# Patient Record
Sex: FEMALE | Race: BLACK OR AFRICAN AMERICAN | NOT HISPANIC OR LATINO | URBAN - METROPOLITAN AREA
[De-identification: names, ages, dates, MRNs, and addresses within clinical notes are randomized per-mention and may not be internally consistent; named-entity substitution may affect disease eponyms.]

---

## 2018-07-18 ENCOUNTER — OUTPATIENT (OUTPATIENT)
Dept: OUTPATIENT SERVICES | Facility: HOSPITAL | Age: 23
LOS: 1 days | Discharge: STILL ADMITTED OR ADMINISTRATIVE | End: 2018-07-18

## 2018-07-19 DIAGNOSIS — J45.909 UNSPECIFIED ASTHMA, UNCOMPLICATED: ICD-10-CM

## 2018-07-19 DIAGNOSIS — Z86.59 PERSONAL HISTORY OF OTHER MENTAL AND BEHAVIORAL DISORDERS: ICD-10-CM

## 2021-12-15 ENCOUNTER — TRANSCRIPTION ENCOUNTER (OUTPATIENT)
Age: 26
End: 2021-12-15

## 2024-02-25 ENCOUNTER — INPATIENT (INPATIENT)
Facility: HOSPITAL | Age: 29
LOS: 3 days | Discharge: HOME | DRG: 987 | End: 2024-02-29
Attending: STUDENT IN AN ORGANIZED HEALTH CARE EDUCATION/TRAINING PROGRAM | Admitting: STUDENT IN AN ORGANIZED HEALTH CARE EDUCATION/TRAINING PROGRAM
Payer: MEDICAID

## 2024-02-25 VITALS
HEART RATE: 107 BPM | RESPIRATION RATE: 30 BRPM | TEMPERATURE: 100 F | DIASTOLIC BLOOD PRESSURE: 70 MMHG | OXYGEN SATURATION: 99 % | SYSTOLIC BLOOD PRESSURE: 130 MMHG

## 2024-02-25 DIAGNOSIS — T14.8XXA OTHER INJURY OF UNSPECIFIED BODY REGION, INITIAL ENCOUNTER: ICD-10-CM

## 2024-02-25 LAB
ALBUMIN SERPL ELPH-MCNC: 3.9 G/DL — SIGNIFICANT CHANGE UP (ref 3.3–5)
ALP SERPL-CCNC: 75 U/L — SIGNIFICANT CHANGE UP (ref 40–120)
ALT FLD-CCNC: 29 U/L — SIGNIFICANT CHANGE UP (ref 10–45)
ANION GAP SERPL CALC-SCNC: 18 MMOL/L — HIGH (ref 5–17)
APTT BLD: 26.3 SEC — SIGNIFICANT CHANGE UP (ref 24.5–35.6)
AST SERPL-CCNC: 22 U/L — SIGNIFICANT CHANGE UP (ref 10–40)
BASE EXCESS BLDV CALC-SCNC: -7 MMOL/L — LOW (ref -2–3)
BILIRUB SERPL-MCNC: <0.1 MG/DL — LOW (ref 0.2–1.2)
BLD GP AB SCN SERPL QL: NEGATIVE — SIGNIFICANT CHANGE UP
BUN SERPL-MCNC: 10 MG/DL — SIGNIFICANT CHANGE UP (ref 7–23)
CA-I SERPL-SCNC: 1.2 MMOL/L — SIGNIFICANT CHANGE UP (ref 1.15–1.33)
CALCIUM SERPL-MCNC: 8.4 MG/DL — SIGNIFICANT CHANGE UP (ref 8.4–10.5)
CHLORIDE BLDV-SCNC: 104 MMOL/L — SIGNIFICANT CHANGE UP (ref 96–108)
CHLORIDE SERPL-SCNC: 104 MMOL/L — SIGNIFICANT CHANGE UP (ref 96–108)
CO2 BLDV-SCNC: 22 MMOL/L — SIGNIFICANT CHANGE UP (ref 22–26)
CO2 SERPL-SCNC: 17 MMOL/L — LOW (ref 22–31)
CREAT SERPL-MCNC: 0.71 MG/DL — SIGNIFICANT CHANGE UP (ref 0.5–1.3)
EGFR: 119 ML/MIN/1.73M2 — SIGNIFICANT CHANGE UP
ETHANOL SERPL-MCNC: 70 MG/DL — HIGH (ref 0–10)
GAS PNL BLDA: SIGNIFICANT CHANGE UP
GAS PNL BLDV: 135 MMOL/L — LOW (ref 136–145)
GAS PNL BLDV: SIGNIFICANT CHANGE UP
GAS PNL BLDV: SIGNIFICANT CHANGE UP
GLUCOSE BLDV-MCNC: 136 MG/DL — HIGH (ref 70–99)
GLUCOSE SERPL-MCNC: 140 MG/DL — HIGH (ref 70–99)
HCG SERPL-ACNC: <2 MIU/ML — SIGNIFICANT CHANGE UP
HCO3 BLDV-SCNC: 20 MMOL/L — LOW (ref 22–29)
HCT VFR BLD CALC: 37.8 % — SIGNIFICANT CHANGE UP (ref 34.5–45)
HCT VFR BLD CALC: 45.2 % — HIGH (ref 34.5–45)
HCT VFR BLD CALC: 45.5 % — HIGH (ref 34.5–45)
HCT VFR BLDA CALC: 38 % — SIGNIFICANT CHANGE UP (ref 34.5–46.5)
HGB BLD CALC-MCNC: 12.6 G/DL — SIGNIFICANT CHANGE UP (ref 11.7–16.1)
HGB BLD-MCNC: 12.5 G/DL — SIGNIFICANT CHANGE UP (ref 11.5–15.5)
HGB BLD-MCNC: 15 G/DL — SIGNIFICANT CHANGE UP (ref 11.5–15.5)
HGB BLD-MCNC: 15.1 G/DL — SIGNIFICANT CHANGE UP (ref 11.5–15.5)
HOROWITZ INDEX BLDV+IHG-RTO: 36 — SIGNIFICANT CHANGE UP
INR BLD: 1.21 RATIO — HIGH (ref 0.85–1.18)
LACTATE BLDV-MCNC: 5.4 MMOL/L — CRITICAL HIGH (ref 0.5–2)
MAGNESIUM SERPL-MCNC: 2.1 MG/DL — SIGNIFICANT CHANGE UP (ref 1.6–2.6)
MCHC RBC-ENTMCNC: 28.8 PG — SIGNIFICANT CHANGE UP (ref 27–34)
MCHC RBC-ENTMCNC: 28.9 PG — SIGNIFICANT CHANGE UP (ref 27–34)
MCHC RBC-ENTMCNC: 29 PG — SIGNIFICANT CHANGE UP (ref 27–34)
MCHC RBC-ENTMCNC: 33 GM/DL — SIGNIFICANT CHANGE UP (ref 32–36)
MCHC RBC-ENTMCNC: 33.1 GM/DL — SIGNIFICANT CHANGE UP (ref 32–36)
MCHC RBC-ENTMCNC: 33.4 GM/DL — SIGNIFICANT CHANGE UP (ref 32–36)
MCV RBC AUTO: 86.3 FL — SIGNIFICANT CHANGE UP (ref 80–100)
MCV RBC AUTO: 87.5 FL — SIGNIFICANT CHANGE UP (ref 80–100)
MCV RBC AUTO: 88 FL — SIGNIFICANT CHANGE UP (ref 80–100)
NRBC # BLD: 0 /100 WBCS — SIGNIFICANT CHANGE UP (ref 0–0)
PCO2 BLDV: 46 MMHG — HIGH (ref 39–42)
PH BLDV: 7.25 — LOW (ref 7.32–7.43)
PHOSPHATE SERPL-MCNC: 2.7 MG/DL — SIGNIFICANT CHANGE UP (ref 2.5–4.5)
PLATELET # BLD AUTO: 213 K/UL — SIGNIFICANT CHANGE UP (ref 150–400)
PLATELET # BLD AUTO: 226 K/UL — SIGNIFICANT CHANGE UP (ref 150–400)
PLATELET # BLD AUTO: 251 K/UL — SIGNIFICANT CHANGE UP (ref 150–400)
PO2 BLDV: 46 MMHG — HIGH (ref 25–45)
POTASSIUM BLDV-SCNC: 3.7 MMOL/L — SIGNIFICANT CHANGE UP (ref 3.5–5.1)
POTASSIUM SERPL-MCNC: 3.8 MMOL/L — SIGNIFICANT CHANGE UP (ref 3.5–5.3)
POTASSIUM SERPL-SCNC: 3.8 MMOL/L — SIGNIFICANT CHANGE UP (ref 3.5–5.3)
PROT SERPL-MCNC: 6.4 G/DL — SIGNIFICANT CHANGE UP (ref 6–8.3)
PROTHROM AB SERPL-ACNC: 12.6 SEC — SIGNIFICANT CHANGE UP (ref 9.5–13)
RBC # BLD: 4.32 M/UL — SIGNIFICANT CHANGE UP (ref 3.8–5.2)
RBC # BLD: 5.17 M/UL — SIGNIFICANT CHANGE UP (ref 3.8–5.2)
RBC # BLD: 5.24 M/UL — HIGH (ref 3.8–5.2)
RBC # FLD: 13.2 % — SIGNIFICANT CHANGE UP (ref 10.3–14.5)
RH IG SCN BLD-IMP: POSITIVE — SIGNIFICANT CHANGE UP
SAO2 % BLDV: 75.7 % — SIGNIFICANT CHANGE UP (ref 67–88)
SODIUM SERPL-SCNC: 139 MMOL/L — SIGNIFICANT CHANGE UP (ref 135–145)
WBC # BLD: 22.81 K/UL — HIGH (ref 3.8–10.5)
WBC # BLD: 23.4 K/UL — HIGH (ref 3.8–10.5)
WBC # BLD: 6.11 K/UL — SIGNIFICANT CHANGE UP (ref 3.8–10.5)
WBC # FLD AUTO: 22.81 K/UL — HIGH (ref 3.8–10.5)
WBC # FLD AUTO: 23.4 K/UL — HIGH (ref 3.8–10.5)
WBC # FLD AUTO: 6.11 K/UL — SIGNIFICANT CHANGE UP (ref 3.8–10.5)

## 2024-02-25 PROCEDURE — 70496 CT ANGIOGRAPHY HEAD: CPT | Mod: 26

## 2024-02-25 PROCEDURE — 72125 CT NECK SPINE W/O DYE: CPT | Mod: 26

## 2024-02-25 PROCEDURE — 71045 X-RAY EXAM CHEST 1 VIEW: CPT | Mod: 26

## 2024-02-25 PROCEDURE — 99222 1ST HOSP IP/OBS MODERATE 55: CPT

## 2024-02-25 PROCEDURE — 74177 CT ABD & PELVIS W/CONTRAST: CPT | Mod: 26

## 2024-02-25 PROCEDURE — 72132 CT LUMBAR SPINE W/DYE: CPT | Mod: 26

## 2024-02-25 PROCEDURE — 72129 CT CHEST SPINE W/DYE: CPT | Mod: 26

## 2024-02-25 PROCEDURE — 99291 CRITICAL CARE FIRST HOUR: CPT | Mod: GC

## 2024-02-25 PROCEDURE — 70486 CT MAXILLOFACIAL W/O DYE: CPT | Mod: 26

## 2024-02-25 PROCEDURE — 73130 X-RAY EXAM OF HAND: CPT | Mod: 26,LT

## 2024-02-25 PROCEDURE — 99221 1ST HOSP IP/OBS SF/LOW 40: CPT | Mod: GC

## 2024-02-25 PROCEDURE — 70450 CT HEAD/BRAIN W/O DYE: CPT | Mod: 26,59

## 2024-02-25 PROCEDURE — 71260 CT THORAX DX C+: CPT | Mod: 26

## 2024-02-25 PROCEDURE — 70498 CT ANGIOGRAPHY NECK: CPT | Mod: 26

## 2024-02-25 PROCEDURE — 12005 RPR S/N/A/GEN/TRK12.6-20.0CM: CPT | Mod: RT,GC

## 2024-02-25 PROCEDURE — 36620 INSERTION CATHETER ARTERY: CPT | Mod: LT

## 2024-02-25 PROCEDURE — 99291 CRITICAL CARE FIRST HOUR: CPT | Mod: 25

## 2024-02-25 RX ORDER — ACETAMINOPHEN 500 MG
1000 TABLET ORAL EVERY 6 HOURS
Refills: 0 | Status: COMPLETED | OUTPATIENT
Start: 2024-02-25 | End: 2024-02-25

## 2024-02-25 RX ORDER — FENTANYL CITRATE 50 UG/ML
25 INJECTION INTRAVENOUS ONCE
Refills: 0 | Status: DISCONTINUED | OUTPATIENT
Start: 2024-02-25 | End: 2024-02-25

## 2024-02-25 RX ORDER — POTASSIUM PHOSPHATE, MONOBASIC POTASSIUM PHOSPHATE, DIBASIC 236; 224 MG/ML; MG/ML
15 INJECTION, SOLUTION INTRAVENOUS ONCE
Refills: 0 | Status: COMPLETED | OUTPATIENT
Start: 2024-02-25 | End: 2024-02-25

## 2024-02-25 RX ORDER — OXYCODONE HYDROCHLORIDE 5 MG/1
10 TABLET ORAL EVERY 4 HOURS
Refills: 0 | Status: DISCONTINUED | OUTPATIENT
Start: 2024-02-25 | End: 2024-02-27

## 2024-02-25 RX ORDER — CHLORHEXIDINE GLUCONATE 213 G/1000ML
1 SOLUTION TOPICAL
Refills: 0 | Status: DISCONTINUED | OUTPATIENT
Start: 2024-02-25 | End: 2024-02-27

## 2024-02-25 RX ORDER — HYDROMORPHONE HYDROCHLORIDE 2 MG/ML
0.25 INJECTION INTRAMUSCULAR; INTRAVENOUS; SUBCUTANEOUS
Refills: 0 | Status: DISCONTINUED | OUTPATIENT
Start: 2024-02-25 | End: 2024-02-25

## 2024-02-25 RX ORDER — AMPICILLIN SODIUM AND SULBACTAM SODIUM 250; 125 MG/ML; MG/ML
3 INJECTION, POWDER, FOR SUSPENSION INTRAMUSCULAR; INTRAVENOUS ONCE
Refills: 0 | Status: COMPLETED | OUTPATIENT
Start: 2024-02-25 | End: 2024-02-25

## 2024-02-25 RX ORDER — LIDOCAINE HCL 20 MG/ML
30 VIAL (ML) INJECTION ONCE
Refills: 0 | Status: COMPLETED | OUTPATIENT
Start: 2024-02-25 | End: 2024-02-25

## 2024-02-25 RX ORDER — SENNA PLUS 8.6 MG/1
2 TABLET ORAL AT BEDTIME
Refills: 0 | Status: DISCONTINUED | OUTPATIENT
Start: 2024-02-25 | End: 2024-02-27

## 2024-02-25 RX ORDER — ONDANSETRON 8 MG/1
4 TABLET, FILM COATED ORAL ONCE
Refills: 0 | Status: COMPLETED | OUTPATIENT
Start: 2024-02-25 | End: 2024-02-25

## 2024-02-25 RX ORDER — LIDOCAINE 4 G/100G
1 CREAM TOPICAL EVERY 24 HOURS
Refills: 0 | Status: DISCONTINUED | OUTPATIENT
Start: 2024-02-25 | End: 2024-02-27

## 2024-02-25 RX ORDER — POLYETHYLENE GLYCOL 3350 17 G/17G
17 POWDER, FOR SOLUTION ORAL DAILY
Refills: 0 | Status: DISCONTINUED | OUTPATIENT
Start: 2024-02-25 | End: 2024-02-27

## 2024-02-25 RX ORDER — ENOXAPARIN SODIUM 100 MG/ML
40 INJECTION SUBCUTANEOUS EVERY 24 HOURS
Refills: 0 | Status: DISCONTINUED | OUTPATIENT
Start: 2024-02-25 | End: 2024-02-27

## 2024-02-25 RX ORDER — TETANUS AND DIPHTHERIA TOXOIDS ADSORBED 2; 2 [LF]/.5ML; [LF]/.5ML
0.5 INJECTION INTRAMUSCULAR ONCE
Refills: 0 | Status: COMPLETED | OUTPATIENT
Start: 2024-02-25 | End: 2024-02-26

## 2024-02-25 RX ORDER — SODIUM CHLORIDE 9 MG/ML
250 INJECTION INTRAMUSCULAR; INTRAVENOUS; SUBCUTANEOUS ONCE
Refills: 0 | Status: DISCONTINUED | OUTPATIENT
Start: 2024-02-25 | End: 2024-02-25

## 2024-02-25 RX ORDER — SODIUM CHLORIDE 9 MG/ML
500 INJECTION, SOLUTION INTRAVENOUS ONCE
Refills: 0 | Status: COMPLETED | OUTPATIENT
Start: 2024-02-25 | End: 2024-02-25

## 2024-02-25 RX ORDER — FENTANYL CITRATE 50 UG/ML
50 INJECTION INTRAVENOUS ONCE
Refills: 0 | Status: DISCONTINUED | OUTPATIENT
Start: 2024-02-25 | End: 2024-02-25

## 2024-02-25 RX ORDER — OXYCODONE HYDROCHLORIDE 5 MG/1
5 TABLET ORAL EVERY 4 HOURS
Refills: 0 | Status: DISCONTINUED | OUTPATIENT
Start: 2024-02-25 | End: 2024-02-27

## 2024-02-25 RX ORDER — HYDROMORPHONE HYDROCHLORIDE 2 MG/ML
0.5 INJECTION INTRAMUSCULAR; INTRAVENOUS; SUBCUTANEOUS ONCE
Refills: 0 | Status: DISCONTINUED | OUTPATIENT
Start: 2024-02-25 | End: 2024-02-25

## 2024-02-25 RX ORDER — CEFAZOLIN SODIUM 1 G
2000 VIAL (EA) INJECTION ONCE
Refills: 0 | Status: COMPLETED | OUTPATIENT
Start: 2024-02-25 | End: 2024-02-25

## 2024-02-25 RX ORDER — SODIUM CHLORIDE 9 MG/ML
1000 INJECTION, SOLUTION INTRAVENOUS
Refills: 0 | Status: DISCONTINUED | OUTPATIENT
Start: 2024-02-25 | End: 2024-02-25

## 2024-02-25 RX ORDER — HYDROMORPHONE HYDROCHLORIDE 2 MG/ML
0.5 INJECTION INTRAMUSCULAR; INTRAVENOUS; SUBCUTANEOUS
Refills: 0 | Status: DISCONTINUED | OUTPATIENT
Start: 2024-02-25 | End: 2024-02-27

## 2024-02-25 RX ORDER — AMPICILLIN SODIUM AND SULBACTAM SODIUM 250; 125 MG/ML; MG/ML
INJECTION, POWDER, FOR SUSPENSION INTRAMUSCULAR; INTRAVENOUS
Refills: 0 | Status: DISCONTINUED | OUTPATIENT
Start: 2024-02-25 | End: 2024-02-27

## 2024-02-25 RX ORDER — AMPICILLIN SODIUM AND SULBACTAM SODIUM 250; 125 MG/ML; MG/ML
3 INJECTION, POWDER, FOR SUSPENSION INTRAMUSCULAR; INTRAVENOUS EVERY 6 HOURS
Refills: 0 | Status: DISCONTINUED | OUTPATIENT
Start: 2024-02-25 | End: 2024-02-27

## 2024-02-25 RX ORDER — HYDROMORPHONE HYDROCHLORIDE 2 MG/ML
0.5 INJECTION INTRAMUSCULAR; INTRAVENOUS; SUBCUTANEOUS
Refills: 0 | Status: DISCONTINUED | OUTPATIENT
Start: 2024-02-25 | End: 2024-02-25

## 2024-02-25 RX ORDER — CALCIUM GLUCONATE 100 MG/ML
2 VIAL (ML) INTRAVENOUS ONCE
Refills: 0 | Status: COMPLETED | OUTPATIENT
Start: 2024-02-25 | End: 2024-02-25

## 2024-02-25 RX ADMIN — OXYCODONE HYDROCHLORIDE 5 MILLIGRAM(S): 5 TABLET ORAL at 21:08

## 2024-02-25 RX ADMIN — HYDROMORPHONE HYDROCHLORIDE 0.5 MILLIGRAM(S): 2 INJECTION INTRAMUSCULAR; INTRAVENOUS; SUBCUTANEOUS at 22:26

## 2024-02-25 RX ADMIN — CHLORHEXIDINE GLUCONATE 1 APPLICATION(S): 213 SOLUTION TOPICAL at 05:18

## 2024-02-25 RX ADMIN — AMPICILLIN SODIUM AND SULBACTAM SODIUM 200 GRAM(S): 250; 125 INJECTION, POWDER, FOR SUSPENSION INTRAMUSCULAR; INTRAVENOUS at 13:06

## 2024-02-25 RX ADMIN — Medication 400 MILLIGRAM(S): at 11:07

## 2024-02-25 RX ADMIN — Medication 1000 MILLIGRAM(S): at 23:17

## 2024-02-25 RX ADMIN — HYDROMORPHONE HYDROCHLORIDE 0.5 MILLIGRAM(S): 2 INJECTION INTRAMUSCULAR; INTRAVENOUS; SUBCUTANEOUS at 19:45

## 2024-02-25 RX ADMIN — HYDROMORPHONE HYDROCHLORIDE 0.5 MILLIGRAM(S): 2 INJECTION INTRAMUSCULAR; INTRAVENOUS; SUBCUTANEOUS at 08:15

## 2024-02-25 RX ADMIN — HYDROMORPHONE HYDROCHLORIDE 0.5 MILLIGRAM(S): 2 INJECTION INTRAMUSCULAR; INTRAVENOUS; SUBCUTANEOUS at 02:48

## 2024-02-25 RX ADMIN — Medication 30 MILLILITER(S): at 03:00

## 2024-02-25 RX ADMIN — HYDROMORPHONE HYDROCHLORIDE 0.5 MILLIGRAM(S): 2 INJECTION INTRAMUSCULAR; INTRAVENOUS; SUBCUTANEOUS at 15:00

## 2024-02-25 RX ADMIN — Medication 1000 MILLIGRAM(S): at 18:00

## 2024-02-25 RX ADMIN — Medication 100 MILLIGRAM(S): at 04:23

## 2024-02-25 RX ADMIN — AMPICILLIN SODIUM AND SULBACTAM SODIUM 200 GRAM(S): 250; 125 INJECTION, POWDER, FOR SUSPENSION INTRAMUSCULAR; INTRAVENOUS at 17:45

## 2024-02-25 RX ADMIN — SODIUM CHLORIDE 100 MILLILITER(S): 9 INJECTION, SOLUTION INTRAVENOUS at 04:23

## 2024-02-25 RX ADMIN — SODIUM CHLORIDE 2000 MILLILITER(S): 9 INJECTION, SOLUTION INTRAVENOUS at 05:09

## 2024-02-25 RX ADMIN — HYDROMORPHONE HYDROCHLORIDE 0.5 MILLIGRAM(S): 2 INJECTION INTRAMUSCULAR; INTRAVENOUS; SUBCUTANEOUS at 07:47

## 2024-02-25 RX ADMIN — HYDROMORPHONE HYDROCHLORIDE 0.5 MILLIGRAM(S): 2 INJECTION INTRAMUSCULAR; INTRAVENOUS; SUBCUTANEOUS at 02:33

## 2024-02-25 RX ADMIN — Medication 1000 MILLIGRAM(S): at 11:33

## 2024-02-25 RX ADMIN — Medication 200 GRAM(S): at 05:01

## 2024-02-25 RX ADMIN — OXYCODONE HYDROCHLORIDE 10 MILLIGRAM(S): 5 TABLET ORAL at 19:27

## 2024-02-25 RX ADMIN — AMPICILLIN SODIUM AND SULBACTAM SODIUM 200 GRAM(S): 250; 125 INJECTION, POWDER, FOR SUSPENSION INTRAMUSCULAR; INTRAVENOUS at 23:03

## 2024-02-25 RX ADMIN — Medication 400 MILLIGRAM(S): at 23:03

## 2024-02-25 RX ADMIN — FENTANYL CITRATE 50 MICROGRAM(S): 50 INJECTION INTRAVENOUS at 00:53

## 2024-02-25 RX ADMIN — OXYCODONE HYDROCHLORIDE 10 MILLIGRAM(S): 5 TABLET ORAL at 20:15

## 2024-02-25 RX ADMIN — FENTANYL CITRATE 25 MICROGRAM(S): 50 INJECTION INTRAVENOUS at 02:00

## 2024-02-25 RX ADMIN — OXYCODONE HYDROCHLORIDE 5 MILLIGRAM(S): 5 TABLET ORAL at 21:55

## 2024-02-25 RX ADMIN — FENTANYL CITRATE 25 MICROGRAM(S): 50 INJECTION INTRAVENOUS at 01:45

## 2024-02-25 RX ADMIN — LIDOCAINE 1 PATCH: 4 CREAM TOPICAL at 16:02

## 2024-02-25 RX ADMIN — HYDROMORPHONE HYDROCHLORIDE 0.5 MILLIGRAM(S): 2 INJECTION INTRAMUSCULAR; INTRAVENOUS; SUBCUTANEOUS at 19:30

## 2024-02-25 RX ADMIN — HYDROMORPHONE HYDROCHLORIDE 0.5 MILLIGRAM(S): 2 INJECTION INTRAMUSCULAR; INTRAVENOUS; SUBCUTANEOUS at 14:15

## 2024-02-25 RX ADMIN — LIDOCAINE 1 PATCH: 4 CREAM TOPICAL at 07:30

## 2024-02-25 RX ADMIN — ONDANSETRON 4 MILLIGRAM(S): 8 TABLET, FILM COATED ORAL at 17:45

## 2024-02-25 RX ADMIN — Medication 400 MILLIGRAM(S): at 05:05

## 2024-02-25 RX ADMIN — ENOXAPARIN SODIUM 40 MILLIGRAM(S): 100 INJECTION SUBCUTANEOUS at 23:04

## 2024-02-25 RX ADMIN — LIDOCAINE 1 PATCH: 4 CREAM TOPICAL at 04:54

## 2024-02-25 RX ADMIN — HYDROMORPHONE HYDROCHLORIDE 0.5 MILLIGRAM(S): 2 INJECTION INTRAMUSCULAR; INTRAVENOUS; SUBCUTANEOUS at 23:21

## 2024-02-25 RX ADMIN — POTASSIUM PHOSPHATE, MONOBASIC POTASSIUM PHOSPHATE, DIBASIC 62.5 MILLIMOLE(S): 236; 224 INJECTION, SOLUTION INTRAVENOUS at 06:21

## 2024-02-25 RX ADMIN — Medication 400 MILLIGRAM(S): at 17:29

## 2024-02-25 RX ADMIN — Medication 1000 MILLIGRAM(S): at 05:35

## 2024-02-25 RX ADMIN — AMPICILLIN SODIUM AND SULBACTAM SODIUM 200 GRAM(S): 250; 125 INJECTION, POWDER, FOR SUSPENSION INTRAMUSCULAR; INTRAVENOUS at 08:36
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
## 2024-02-25 NOTE — PHYSICAL THERAPY INITIAL EVALUATION ADULT - ADDITIONAL COMMENTS
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
## 2024-02-25 NOTE — CONSULT NOTE ADULT - ATTENDING SUPERVISION STATEMENT
Quality 431: Preventive Care And Screening: Unhealthy Alcohol Use - Screening: Patient not identified as an unhealthy alcohol user when screened for unhealthy alcohol use using a systematic screening method
Quality 110: Preventive Care And Screening: Influenza Immunization: Influenza Immunization Ordered or Recommended, but not Administered due to system reason
Resident
Resident
Additional Notes: Advised to see local pharmacy for flu vaccination.
Detail Level: Detailed
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
## 2024-02-25 NOTE — ED PROVIDER NOTE - PHYSICAL EXAMINATION
GENERAL: Awake, alert, uncomfortable appearing GCS 15  HEENT: NC/AT, moist mucous membranes, PERRL, EOMI left tenderness zygomatic arch in c spine collar   LUNGS: CTAB, no wheezes or crackles b/l breath sounds   CARDIAC: tachycardiac no m/r/g  ABDOMEN: Soft, ,non tender, non distended, no rebound, no guarding  BACK: No midline spinal tenderness, no CVA tenderness  EXT: No edema, no calf tenderness, 2+ DP pulses bilaterally, no deformities.  NEURO: A&Ox3. Moving all extremities.  SKIN: lacerations to right posterior chest, right sided lateral to breast  right thigh   PSYCH: Normal affect.

## 2024-02-25 NOTE — PHYSICAL THERAPY INITIAL EVALUATION ADULT - ACTIVE RANGE OF MOTION EXAMINATION, REHAB EVAL
L hand/wrist splint, elbow WFL, shld WFL, limited 2* pain; RUE WFL, BLE WFL/Left UE Active ROM was WFL (within functional limits)/Right UE Active ROM was WFL (within functional limits)/Left LE Active ROM was WFL (within functional limits)/Right LE Active ROM was WFL (within functional limits)

## 2024-02-25 NOTE — PROCEDURE NOTE - NSPROCDETAILS_GEN_ALL_CORE
Seldinger technique/dressing applied/secured in place/sterile dressing applied/supine position/percutaneous
location identified, draped/prepped, sterile technique used, needle inserted/introduced/positive blood return obtained via catheter/connected to a pressurized flush line/sutured in place/all materials/supplies accounted for at end of procedure
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
## 2024-02-25 NOTE — H&P ADULT - ATTENDING COMMENTS
Level 1 Trauma Activation.    Primary survey significant for moderate tachycardia.     Secondary survey with multiple small lacerations over neck, chest and abdomen.   Complex large complex laceration to thenar eminence of left hand. Neuro exam limited due to pain and patient compliance.     CT head, C-spine, Chest Abdomen Pelvis performed.   -Right hemothorax with active extravasation.  -Fractures of the right posterior third rib and right transverse process of T3.    A/P:  -Right sided pigtail catheter placed with 600cc output.  -Admit to SICU  -Multimodal pain control  -Hand surgery evaluation for complex left hand laceration.
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
## 2024-02-25 NOTE — CONSULT NOTE ADULT - SUBJECTIVE AND OBJECTIVE BOX
See dictated note.  Written informed consent obtained.  Tolerated initial exploration and repair of left hand, splinted.  Rec: IV abx, Ancef or Unasyn/Elevation.  Will eventually require further definitive repair of injuries when stable for general anesthesia as in- or outpt.  Prognosis: guarded-poor.

## 2024-02-25 NOTE — PHYSICAL THERAPY INITIAL EVALUATION ADULT - PLANNED THERAPY INTERVENTIONS, PT EVAL
stair neg: GOAL: pt will neg 8 steps 1 HR ind in 4wks./bed mobility training/gait training/transfer training

## 2024-02-26 ENCOUNTER — TRANSCRIPTION ENCOUNTER (OUTPATIENT)
Age: 29
End: 2024-02-26

## 2024-02-26 DIAGNOSIS — J94.2 HEMOTHORAX: ICD-10-CM

## 2024-02-26 LAB
ALBUMIN SERPL ELPH-MCNC: 3.2 G/DL — LOW (ref 3.3–5)
ALP SERPL-CCNC: 57 U/L — SIGNIFICANT CHANGE UP (ref 40–120)
ALT FLD-CCNC: 16 U/L — SIGNIFICANT CHANGE UP (ref 10–45)
ANION GAP SERPL CALC-SCNC: 8 MMOL/L — SIGNIFICANT CHANGE UP (ref 5–17)
APTT BLD: 30.4 SEC — SIGNIFICANT CHANGE UP (ref 24.5–35.6)
AST SERPL-CCNC: 17 U/L — SIGNIFICANT CHANGE UP (ref 10–40)
BILIRUB SERPL-MCNC: 0.3 MG/DL — SIGNIFICANT CHANGE UP (ref 0.2–1.2)
BUN SERPL-MCNC: 8 MG/DL — SIGNIFICANT CHANGE UP (ref 7–23)
CALCIUM SERPL-MCNC: 8.2 MG/DL — LOW (ref 8.4–10.5)
CHLORIDE SERPL-SCNC: 104 MMOL/L — SIGNIFICANT CHANGE UP (ref 96–108)
CO2 SERPL-SCNC: 27 MMOL/L — SIGNIFICANT CHANGE UP (ref 22–31)
CREAT SERPL-MCNC: 0.67 MG/DL — SIGNIFICANT CHANGE UP (ref 0.5–1.3)
EGFR: 123 ML/MIN/1.73M2 — SIGNIFICANT CHANGE UP
GLUCOSE SERPL-MCNC: 102 MG/DL — HIGH (ref 70–99)
HCT VFR BLD CALC: 28.7 % — LOW (ref 34.5–45)
HCT VFR BLD CALC: 29.5 % — LOW (ref 34.5–45)
HCT VFR BLD CALC: 29.7 % — LOW (ref 34.5–45)
HGB BLD-MCNC: 10.1 G/DL — LOW (ref 11.5–15.5)
HGB BLD-MCNC: 9.8 G/DL — LOW (ref 11.5–15.5)
HGB BLD-MCNC: 9.8 G/DL — LOW (ref 11.5–15.5)
INR BLD: 1.31 RATIO — HIGH (ref 0.85–1.18)
MAGNESIUM SERPL-MCNC: 2 MG/DL — SIGNIFICANT CHANGE UP (ref 1.6–2.6)
MCHC RBC-ENTMCNC: 29.1 PG — SIGNIFICANT CHANGE UP (ref 27–34)
MCHC RBC-ENTMCNC: 29.2 PG — SIGNIFICANT CHANGE UP (ref 27–34)
MCHC RBC-ENTMCNC: 29.3 PG — SIGNIFICANT CHANGE UP (ref 27–34)
MCHC RBC-ENTMCNC: 33.2 GM/DL — SIGNIFICANT CHANGE UP (ref 32–36)
MCHC RBC-ENTMCNC: 34 GM/DL — SIGNIFICANT CHANGE UP (ref 32–36)
MCHC RBC-ENTMCNC: 34.1 GM/DL — SIGNIFICANT CHANGE UP (ref 32–36)
MCV RBC AUTO: 85.8 FL — SIGNIFICANT CHANGE UP (ref 80–100)
MCV RBC AUTO: 85.9 FL — SIGNIFICANT CHANGE UP (ref 80–100)
MCV RBC AUTO: 87.5 FL — SIGNIFICANT CHANGE UP (ref 80–100)
NRBC # BLD: 0 /100 WBCS — SIGNIFICANT CHANGE UP (ref 0–0)
PHOSPHATE SERPL-MCNC: 3.4 MG/DL — SIGNIFICANT CHANGE UP (ref 2.5–4.5)
PLATELET # BLD AUTO: 127 K/UL — LOW (ref 150–400)
PLATELET # BLD AUTO: 136 K/UL — LOW (ref 150–400)
PLATELET # BLD AUTO: 138 K/UL — LOW (ref 150–400)
POTASSIUM SERPL-MCNC: 3.7 MMOL/L — SIGNIFICANT CHANGE UP (ref 3.5–5.3)
POTASSIUM SERPL-SCNC: 3.7 MMOL/L — SIGNIFICANT CHANGE UP (ref 3.5–5.3)
PROT SERPL-MCNC: 5.2 G/DL — LOW (ref 6–8.3)
PROTHROM AB SERPL-ACNC: 14.3 SEC — HIGH (ref 9.5–13)
RBC # BLD: 3.34 M/UL — LOW (ref 3.8–5.2)
RBC # BLD: 3.37 M/UL — LOW (ref 3.8–5.2)
RBC # BLD: 3.46 M/UL — LOW (ref 3.8–5.2)
RBC # FLD: 13.4 % — SIGNIFICANT CHANGE UP (ref 10.3–14.5)
RBC # FLD: 13.5 % — SIGNIFICANT CHANGE UP (ref 10.3–14.5)
RBC # FLD: 13.5 % — SIGNIFICANT CHANGE UP (ref 10.3–14.5)
SODIUM SERPL-SCNC: 139 MMOL/L — SIGNIFICANT CHANGE UP (ref 135–145)
WBC # BLD: 7.3 K/UL — SIGNIFICANT CHANGE UP (ref 3.8–10.5)
WBC # BLD: 8.95 K/UL — SIGNIFICANT CHANGE UP (ref 3.8–10.5)
WBC # BLD: 9.57 K/UL — SIGNIFICANT CHANGE UP (ref 3.8–10.5)
WBC # FLD AUTO: 7.3 K/UL — SIGNIFICANT CHANGE UP (ref 3.8–10.5)
WBC # FLD AUTO: 8.95 K/UL — SIGNIFICANT CHANGE UP (ref 3.8–10.5)
WBC # FLD AUTO: 9.57 K/UL — SIGNIFICANT CHANGE UP (ref 3.8–10.5)

## 2024-02-26 PROCEDURE — 99232 SBSQ HOSP IP/OBS MODERATE 35: CPT

## 2024-02-26 PROCEDURE — 71045 X-RAY EXAM CHEST 1 VIEW: CPT | Mod: 26

## 2024-02-26 PROCEDURE — 99233 SBSQ HOSP IP/OBS HIGH 50: CPT | Mod: 57

## 2024-02-26 PROCEDURE — 93010 ELECTROCARDIOGRAM REPORT: CPT

## 2024-02-26 RX ORDER — ACETAMINOPHEN 500 MG
1000 TABLET ORAL EVERY 6 HOURS
Refills: 0 | Status: COMPLETED | OUTPATIENT
Start: 2024-02-26 | End: 2024-02-27

## 2024-02-26 RX ORDER — PHYTONADIONE (VIT K1) 5 MG
5 TABLET ORAL DAILY
Refills: 0 | Status: DISCONTINUED | OUTPATIENT
Start: 2024-02-26 | End: 2024-02-27

## 2024-02-26 RX ORDER — KETOROLAC TROMETHAMINE 30 MG/ML
15 SYRINGE (ML) INJECTION ONCE
Refills: 0 | Status: DISCONTINUED | OUTPATIENT
Start: 2024-02-26 | End: 2024-02-26

## 2024-02-26 RX ORDER — HYDROMORPHONE HYDROCHLORIDE 2 MG/ML
0.5 INJECTION INTRAMUSCULAR; INTRAVENOUS; SUBCUTANEOUS ONCE
Refills: 0 | Status: DISCONTINUED | OUTPATIENT
Start: 2024-02-26 | End: 2024-02-26

## 2024-02-26 RX ORDER — LIDOCAINE 4 G/100G
1 CREAM TOPICAL ONCE
Refills: 0 | Status: DISCONTINUED | OUTPATIENT
Start: 2024-02-26 | End: 2024-02-26

## 2024-02-26 RX ADMIN — HYDROMORPHONE HYDROCHLORIDE 0.5 MILLIGRAM(S): 2 INJECTION INTRAMUSCULAR; INTRAVENOUS; SUBCUTANEOUS at 22:20

## 2024-02-26 RX ADMIN — AMPICILLIN SODIUM AND SULBACTAM SODIUM 200 GRAM(S): 250; 125 INJECTION, POWDER, FOR SUSPENSION INTRAMUSCULAR; INTRAVENOUS at 23:45

## 2024-02-26 RX ADMIN — LIDOCAINE 1 PATCH: 4 CREAM TOPICAL at 05:42

## 2024-02-26 RX ADMIN — Medication 15 MILLIGRAM(S): at 02:27

## 2024-02-26 RX ADMIN — OXYCODONE HYDROCHLORIDE 10 MILLIGRAM(S): 5 TABLET ORAL at 10:40

## 2024-02-26 RX ADMIN — HYDROMORPHONE HYDROCHLORIDE 0.5 MILLIGRAM(S): 2 INJECTION INTRAMUSCULAR; INTRAVENOUS; SUBCUTANEOUS at 04:18

## 2024-02-26 RX ADMIN — HYDROMORPHONE HYDROCHLORIDE 0.5 MILLIGRAM(S): 2 INJECTION INTRAMUSCULAR; INTRAVENOUS; SUBCUTANEOUS at 23:15

## 2024-02-26 RX ADMIN — Medication 1000 MILLIGRAM(S): at 23:15

## 2024-02-26 RX ADMIN — AMPICILLIN SODIUM AND SULBACTAM SODIUM 200 GRAM(S): 250; 125 INJECTION, POWDER, FOR SUSPENSION INTRAMUSCULAR; INTRAVENOUS at 06:39

## 2024-02-26 RX ADMIN — Medication 400 MILLIGRAM(S): at 12:55

## 2024-02-26 RX ADMIN — HYDROMORPHONE HYDROCHLORIDE 0.5 MILLIGRAM(S): 2 INJECTION INTRAMUSCULAR; INTRAVENOUS; SUBCUTANEOUS at 04:34

## 2024-02-26 RX ADMIN — HYDROMORPHONE HYDROCHLORIDE 0.5 MILLIGRAM(S): 2 INJECTION INTRAMUSCULAR; INTRAVENOUS; SUBCUTANEOUS at 17:50

## 2024-02-26 RX ADMIN — LIDOCAINE 1 PATCH: 4 CREAM TOPICAL at 07:58

## 2024-02-26 RX ADMIN — HYDROMORPHONE HYDROCHLORIDE 0.5 MILLIGRAM(S): 2 INJECTION INTRAMUSCULAR; INTRAVENOUS; SUBCUTANEOUS at 14:38

## 2024-02-26 RX ADMIN — OXYCODONE HYDROCHLORIDE 10 MILLIGRAM(S): 5 TABLET ORAL at 06:43

## 2024-02-26 RX ADMIN — HYDROMORPHONE HYDROCHLORIDE 0.5 MILLIGRAM(S): 2 INJECTION INTRAMUSCULAR; INTRAVENOUS; SUBCUTANEOUS at 14:53

## 2024-02-26 RX ADMIN — OXYCODONE HYDROCHLORIDE 10 MILLIGRAM(S): 5 TABLET ORAL at 06:39

## 2024-02-26 RX ADMIN — CHLORHEXIDINE GLUCONATE 1 APPLICATION(S): 213 SOLUTION TOPICAL at 05:31

## 2024-02-26 RX ADMIN — Medication 15 MILLIGRAM(S): at 02:45

## 2024-02-26 RX ADMIN — HYDROMORPHONE HYDROCHLORIDE 0.5 MILLIGRAM(S): 2 INJECTION INTRAMUSCULAR; INTRAVENOUS; SUBCUTANEOUS at 18:30

## 2024-02-26 RX ADMIN — HYDROMORPHONE HYDROCHLORIDE 0.5 MILLIGRAM(S): 2 INJECTION INTRAMUSCULAR; INTRAVENOUS; SUBCUTANEOUS at 08:13

## 2024-02-26 RX ADMIN — ENOXAPARIN SODIUM 40 MILLIGRAM(S): 100 INJECTION SUBCUTANEOUS at 22:01

## 2024-02-26 RX ADMIN — Medication 25 MILLIGRAM(S): at 21:38

## 2024-02-26 RX ADMIN — TETANUS AND DIPHTHERIA TOXOIDS ADSORBED 0.5 MILLILITER(S): 2; 2 INJECTION INTRAMUSCULAR at 12:56

## 2024-02-26 RX ADMIN — OXYCODONE HYDROCHLORIDE 10 MILLIGRAM(S): 5 TABLET ORAL at 23:47

## 2024-02-26 RX ADMIN — OXYCODONE HYDROCHLORIDE 10 MILLIGRAM(S): 5 TABLET ORAL at 11:10

## 2024-02-26 RX ADMIN — Medication 1000 MILLIGRAM(S): at 13:30

## 2024-02-26 RX ADMIN — HYDROMORPHONE HYDROCHLORIDE 0.5 MILLIGRAM(S): 2 INJECTION INTRAMUSCULAR; INTRAVENOUS; SUBCUTANEOUS at 18:05

## 2024-02-26 RX ADMIN — Medication 5 MILLIGRAM(S): at 12:53

## 2024-02-26 RX ADMIN — Medication 400 MILLIGRAM(S): at 17:41

## 2024-02-26 RX ADMIN — Medication 400 MILLIGRAM(S): at 23:02

## 2024-02-26 RX ADMIN — HYDROMORPHONE HYDROCHLORIDE 0.5 MILLIGRAM(S): 2 INJECTION INTRAMUSCULAR; INTRAVENOUS; SUBCUTANEOUS at 18:15

## 2024-02-26 RX ADMIN — AMPICILLIN SODIUM AND SULBACTAM SODIUM 200 GRAM(S): 250; 125 INJECTION, POWDER, FOR SUSPENSION INTRAMUSCULAR; INTRAVENOUS at 17:41

## 2024-02-26 RX ADMIN — HYDROMORPHONE HYDROCHLORIDE 0.5 MILLIGRAM(S): 2 INJECTION INTRAMUSCULAR; INTRAVENOUS; SUBCUTANEOUS at 07:58

## 2024-02-26 RX ADMIN — Medication 1000 MILLIGRAM(S): at 18:11

## 2024-02-26 RX ADMIN — OXYCODONE HYDROCHLORIDE 10 MILLIGRAM(S): 5 TABLET ORAL at 00:44

## 2024-02-26 RX ADMIN — AMPICILLIN SODIUM AND SULBACTAM SODIUM 200 GRAM(S): 250; 125 INJECTION, POWDER, FOR SUSPENSION INTRAMUSCULAR; INTRAVENOUS at 12:55

## 2024-02-26 RX ADMIN — LIDOCAINE 1 PATCH: 4 CREAM TOPICAL at 17:12

## 2024-02-26 RX ADMIN — OXYCODONE HYDROCHLORIDE 10 MILLIGRAM(S): 5 TABLET ORAL at 00:52

## 2024-02-26 NOTE — PROGRESS NOTE ADULT - SUBJECTIVE AND OBJECTIVE BOX
interval events:  - unasyn added  - advanced diet to regular diet  - d/c nicholson, passed TOV  - lovenox  - IVL  - nausea, given zofran x1    SUBJECTIVE/ROS:  [ ] A ten-point review of systems was otherwise negative except as noted.  [ ] Due to altered mental status/intubation, subjective information were not able to be obtained from the patient. History was obtained, to the extent possible, from review of the chart and collateral sources of information.      NEURO  Exam: awake, alert, oriented  Meds: HYDROmorphone  Injectable 0.5 milliGRAM(s) IV Push every 3 hours PRN breakthrough pain  oxyCODONE    IR 5 milliGRAM(s) Oral every 4 hours PRN Moderate Pain (4 - 6)  oxyCODONE    IR 10 milliGRAM(s) Oral every 4 hours PRN Severe Pain (7 - 10)  [x] Adequacy of sedation and pain control has been assessed and adjusted      RESPIRATORY  RR: 21 (02-25-24 @ 23:00) (15 - 42)  SpO2: 97% (02-25-24 @ 23:00) (93% - 100%)  Exam: unlabored, clear to auscultation bilaterally  Mechanical Ventilation:   ABG - ( 25 Feb 2024 10:10 )  pH: 7.39  /  pCO2: 41    /  pO2: 79    / HCO3: 25    / Base Excess: -0.2  /  SaO2: 97.2        CARDIOVASCULAR  HR: 59 (02-25-24 @ 23:00) (59 - 107)  BP: 149/72 (02-25-24 @ 08:00) (97/44 - 150/89)  BP(mean): 104 (02-25-24 @ 08:00) (64 - 107)  ABP: 118/62 (02-25-24 @ 23:00) (95/46 - 159/101)  ABP(mean): 88 (02-25-24 @ 23:00) (64 - 124)  VBG - ( 25 Feb 2024 01:56 )  pH: 7.25  /  pCO2: 46    /  pO2: 46    / HCO3: 20    / Base Excess: -7.0  /  SaO2: 75.7   Lactate: 5.4      Exam: regular rate and rhythm  Cardiac Rhythm: sinus  Perfusion     [x]Adequate   [ ]Inadequate  Mentation   [x]Normal       [ ]Reduced  Extremities  [x]Warm         [ ]Cool  Volume Status [ ]Hypervolemic [x]Euvolemic [ ]Hypovolemic      GI/NUTRITION  Exam: soft, nontender, nondistended  Diet: reg  Meds: polyethylene glycol 3350 17 Gram(s) Oral daily  senna 2 Tablet(s) Oral at bedtime      GENITOURINARY  I&O's Detail    02-24 @ 07:01  -  02-25 @ 07:00  --------------------------------------------------------  IN:    IV PiggyBack: 212.5 mL    IV PiggyBack: 100 mL    Lactated Ringers: 400 mL    Lactated Ringers Bolus: 500 mL    PRBCs (Packed Red Blood Cells): 600 mL  Total IN: 1812.5 mL    OUT:    Chest Tube (mL): 880 mL    Voided (mL): 250 mL  Total OUT: 1130 mL    Total NET: 682.5 mL      02-25 @ 07:01 - 02-26 @ 00:11  --------------------------------------------------------  IN:    IV PiggyBack: 100 mL    IV PiggyBack: 587.5 mL    Lactated Ringers: 1200 mL    Oral Fluid: 500 mL  Total IN: 2387.5 mL    OUT:    Chest Tube (mL): 90 mL    Voided (mL): 600 mL  Total OUT: 690 mL    Total NET: 1697.5 mL        Weight (kg): 82.9 (02-25 @ 01:40)  02-25    139  |  104  |  10  ----------------------------<  140<H>  3.8   |  17<L>  |  0.71    Ca    8.4      25 Feb 2024 02:17  Phos  2.7     02-25  Mg     2.1     02-25    TPro  6.4  /  Alb  3.9  /  TBili  <0.1<L>  /  DBili  x   /  AST  22  /  ALT  29  /  AlkPhos  75  02-25    [ ] Nicholson catheter, indication: N/A  Meds:       HEMATOLOGIC  Meds: enoxaparin Injectable 40 milliGRAM(s) SubCutaneous every 24 hours    [x] VTE Prophylaxis                        15.1   23.40 )-----------( 226      ( 25 Feb 2024 04:27 )             45.2     PT/INR - ( 25 Feb 2024 02:17 )   PT: 12.6 sec;   INR: 1.21 ratio         PTT - ( 25 Feb 2024 02:17 )  PTT:26.3 sec  Transfusion     [ ] PRBC   [ ] Platelets   [ ] FFP   [ ] Cryoprecipitate      INFECTIOUS DISEASES  WBC Count: 23.40 K/uL (02-25 @ 04:27)  WBC Count: 22.81 K/uL (02-25 @ 03:32)  WBC Count: 6.11 K/uL (02-25 @ 02:17)    RECENT CULTURES:    Meds: ampicillin/sulbactam  IVPB      ampicillin/sulbactam  IVPB 3 Gram(s) IV Intermittent every 6 hours  diphtheria/tetanus Vaccine - Adult 0.5 milliLiter(s) IntraMuscular once      ENDOCRINE  CAPILLARY BLOOD GLUCOSE      OTHER MEDICATIONS:  chlorhexidine 2% Cloths 1 Application(s) Topical <User Schedule>  lidocaine   4% Patch 1 Patch Transdermal every 24 hours      CODE STATUS: full code

## 2024-02-26 NOTE — PATIENT PROFILE ADULT - NSPROHMSYMPCOND_GEN_A_NUR
adhd, anxiety, depression  R ankle surgery, w/ residual intermittent swelling RLE/behavioral health/musculoskeletal
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
## 2024-02-26 NOTE — PATIENT PROFILE ADULT - FALL HARM RISK - HARM RISK INTERVENTIONS
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
## 2024-02-26 NOTE — PROGRESS NOTE ADULT - SUBJECTIVE AND OBJECTIVE BOX
Subjective: "Hello"  States pain in back and when she breathes      V/S  T(C): 36.8 (02-26-24 @ 11:00), Max: 37.1 (02-25-24 @ 23:00)  HR: 82 (02-26-24 @ 11:00) (59 - 94)  ABP: 123/63 (02-26-24 @ 11:00) (102/53 - 131/70)  ABP(mean): 88 (02-26-24 @ 11:00) (72 - 97)  RR: 21 (02-26-24 @ 11:00) (15 - 30)  SpO2: 98% (02-26-24 @ 11:00) (92% - 98%)      CHEST TUBE:    R pigtail waterseal>serosang        AIR LEAKS:  [ ] YES [ x] NO    I&O's Detail    25 Feb 2024 07:01  -  26 Feb 2024 07:00  --------------------------------------------------------  IN:    IV PiggyBack: 100 mL    IV PiggyBack: 587.5 mL    Lactated Ringers: 1200 mL    Oral Fluid: 500 mL  Total IN: 2387.5 mL    OUT:    Chest Tube (mL): 120 mL    Voided (mL): 900 mL  Total OUT: 1020 mL    Total NET: 1367.5 mL      26 Feb 2024 07:01  -  26 Feb 2024 14:59  --------------------------------------------------------  IN:    Oral Fluid: 200 mL  Total IN: 200 mL    OUT:    Voided (mL): 250 mL  Total OUT: 250 mL    Total NET: -50 mL          02-25-24 @ 07:01  -  02-26-24 @ 07:00  --------------------------------------------------------  IN: 2387.5 mL / OUT: 1020 mL / NET: 1367.5 mL    02-26-24 @ 07:01  -  02-26-24 @ 14:59  --------------------------------------------------------  IN: 200 mL / OUT: 250 mL / NET: -50 mL      MEDICATIONS  (STANDING):  acetaminophen   IVPB .. 1000 milliGRAM(s) IV Intermittent every 6 hours  ampicillin/sulbactam  IVPB      ampicillin/sulbactam  IVPB 3 Gram(s) IV Intermittent every 6 hours  chlorhexidine 2% Cloths 1 Application(s) Topical <User Schedule>  enoxaparin Injectable 40 milliGRAM(s) SubCutaneous every 24 hours  lidocaine   4% Patch 1 Patch Transdermal every 24 hours  phytonadione   Solution 5 milliGRAM(s) Oral daily  polyethylene glycol 3350 17 Gram(s) Oral daily  senna 2 Tablet(s) Oral at bedtime      02-26    139  |  104  |  8   ----------------------------<  102<H>  3.7   |  27  |  0.67    Ca    8.2<L>      26 Feb 2024 06:11  Phos  3.4     02-26  Mg     2.0     02-26    TPro  5.2<L>  /  Alb  3.2<L>  /  TBili  0.3  /  DBili  x   /  AST  17  /  ALT  16  /  AlkPhos  57  02-26                               10.1   9.57  )-----------( 138      ( 26 Feb 2024 06:11 )             29.7        PT/INR - ( 26 Feb 2024 06:11 )   PT: 14.3 sec;   INR: 1.31 ratio         PTT - ( 26 Feb 2024 06:11 )  PTT:30.4 sec             Physical Exam:    General: NAD, laying comfortably in bed  Neck: 3 penetrating wounds to neck base closed c/d/i  Chest: No chest wall tenderness, R posterior chest penetrating wound closed c/d/i.  Cardiac: S1, S2, RRR  Respiratory: Bilateral breath sounds, clear and equal bilaterally   R   CT in place with serosang  output. waterseal   Abdomen:  RUQ penetrating wound closed c/d/i, soft, non-distended,   Ext: R lateral thigh penetrating wound closed c/d/i, LUE w/ large laceration repaired and ace wrapped.                  PAST MEDICAL & SURGICAL HISTORY:
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
## 2024-02-26 NOTE — PATIENT PROFILE ADULT - NSTOBACCOCESSATIONEDU4_GEN_A_NUR
bones(Osteoporosis,prev fx,steroid use,metastatic bone ca) Smoking even a single puff increases the likelihood of a full relapse, withdrawal symptoms peak within 1-2 weeks, but can persist for months

## 2024-02-26 NOTE — PROGRESS NOTE ADULT - SUBJECTIVE AND OBJECTIVE BOX
SURGERY DAILY PROGRESS NOTE    SUBJECTIVE: Patient seen and evaluated on AM rounds.     Overnight Events:  No acute events overnight  -----------------------------------------------------------------------------------------------------------------------------------------------------------------------------------------------------------  OBJECTIVE:  Vital Signs Last 24 Hrs  T(C): 36.8 (26 Feb 2024 07:00), Max: 37.1 (25 Feb 2024 11:00)  T(F): 98.2 (26 Feb 2024 07:00), Max: 98.8 (25 Feb 2024 23:00)  HR: 77 (26 Feb 2024 10:00) (59 - 94)  BP: --  BP(mean): --  RR: 19 (26 Feb 2024 10:00) (15 - 41)  SpO2: 97% (26 Feb 2024 10:00) (92% - 100%)    Parameters below as of 26 Feb 2024 09:27  Patient On (Oxygen Delivery Method): room air      I&O's Detail    25 Feb 2024 07:01  -  26 Feb 2024 07:00  --------------------------------------------------------  IN:    IV PiggyBack: 100 mL    IV PiggyBack: 587.5 mL    Lactated Ringers: 1200 mL    Oral Fluid: 500 mL  Total IN: 2387.5 mL    OUT:    Chest Tube (mL): 120 mL    Voided (mL): 900 mL  Total OUT: 1020 mL    Total NET: 1367.5 mL      26 Feb 2024 07:01  -  26 Feb 2024 10:53  --------------------------------------------------------  IN:    Oral Fluid: 200 mL  Total IN: 200 mL    OUT:    Voided (mL): 250 mL  Total OUT: 250 mL    Total NET: -50 mL        Daily     Daily   MEDICATIONS  (STANDING):  acetaminophen   IVPB .. 1000 milliGRAM(s) IV Intermittent every 6 hours  ampicillin/sulbactam  IVPB      ampicillin/sulbactam  IVPB 3 Gram(s) IV Intermittent every 6 hours  chlorhexidine 2% Cloths 1 Application(s) Topical <User Schedule>  diphtheria/tetanus Vaccine - Adult 0.5 milliLiter(s) IntraMuscular once  enoxaparin Injectable 40 milliGRAM(s) SubCutaneous every 24 hours  lidocaine   4% Patch 1 Patch Transdermal every 24 hours  polyethylene glycol 3350 17 Gram(s) Oral daily  senna 2 Tablet(s) Oral at bedtime    MEDICATIONS  (PRN):  HYDROmorphone  Injectable 0.5 milliGRAM(s) IV Push every 3 hours PRN breakthrough pain  oxyCODONE    IR 5 milliGRAM(s) Oral every 4 hours PRN Moderate Pain (4 - 6)  oxyCODONE    IR 10 milliGRAM(s) Oral every 4 hours PRN Severe Pain (7 - 10)      LABS:                        10.1   9.57  )-----------( 138      ( 26 Feb 2024 06:11 )             29.7     02-26    139  |  104  |  8   ----------------------------<  102<H>  3.7   |  27  |  0.67    Ca    8.2<L>      26 Feb 2024 06:11  Phos  3.4     02-26  Mg     2.0     02-26    TPro  5.2<L>  /  Alb  3.2<L>  /  TBili  0.3  /  DBili  x   /  AST  17  /  ALT  16  /  AlkPhos  57  02-26    PT/INR - ( 26 Feb 2024 06:11 )   PT: 14.3 sec;   INR: 1.31 ratio         PTT - ( 26 Feb 2024 06:11 )  PTT:30.4 sec  Urinalysis Basic - ( 26 Feb 2024 06:11 )    Color: x / Appearance: x / SG: x / pH: x  Gluc: 102 mg/dL / Ketone: x  / Bili: x / Urobili: x   Blood: x / Protein: x / Nitrite: x   Leuk Esterase: x / RBC: x / WBC x   Sq Epi: x / Non Sq Epi: x / Bacteria: x        PHYSICAL EXAM:  General: NAD, laying comfortably in bed  HEENT: Normocephalic, atraumatic, EOMI, PEERLA.  Neck: 3 penetrating wounds to neck base closed c/d/i  Chest: No chest wall tenderness, R posterior chest penetrating wound closed c/d/i. CT in place with serous output.   Cardiac: S1, S2, RRR  Respiratory: Bilateral breath sounds, clear and equal bilaterally  Abdomen:  RUQ penetrating wound closed c/d/i, soft, non-distended, TTP b /l UQ, no rebound, no guarding, no masses palpated  Groin: Normal appearing  Ext: R lateral thigh penetrating wound closed c/d/i, LUE w/ large laceration repaired and ace wrapped. palp radial b/l UE, b/l DP palp in Lower Extrem. motor and sensory grossly intact in all 4 extremities  Back: no TTP, no palpable runoff/stepoff/deformity
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
## 2024-02-27 LAB
ALBUMIN SERPL ELPH-MCNC: 3.3 G/DL — SIGNIFICANT CHANGE UP (ref 3.3–5)
ALBUMIN SERPL ELPH-MCNC: 3.4 G/DL — SIGNIFICANT CHANGE UP (ref 3.3–5)
ALP SERPL-CCNC: 56 U/L — SIGNIFICANT CHANGE UP (ref 40–120)
ALP SERPL-CCNC: 56 U/L — SIGNIFICANT CHANGE UP (ref 40–120)
ALT FLD-CCNC: 19 U/L — SIGNIFICANT CHANGE UP (ref 10–45)
ALT FLD-CCNC: 21 U/L — SIGNIFICANT CHANGE UP (ref 10–45)
ANION GAP SERPL CALC-SCNC: 11 MMOL/L — SIGNIFICANT CHANGE UP (ref 5–17)
APTT BLD: 29 SEC — SIGNIFICANT CHANGE UP (ref 24.5–35.6)
APTT BLD: 29.5 SEC — SIGNIFICANT CHANGE UP (ref 24.5–35.6)
AST SERPL-CCNC: 23 U/L — SIGNIFICANT CHANGE UP (ref 10–40)
AST SERPL-CCNC: 25 U/L — SIGNIFICANT CHANGE UP (ref 10–40)
BILIRUB SERPL-MCNC: 0.1 MG/DL — LOW (ref 0.2–1.2)
BILIRUB SERPL-MCNC: 0.2 MG/DL — SIGNIFICANT CHANGE UP (ref 0.2–1.2)
BUN SERPL-MCNC: 4 MG/DL — LOW (ref 7–23)
BUN SERPL-MCNC: <4 MG/DL — LOW (ref 7–23)
CALCIUM SERPL-MCNC: 8.4 MG/DL — SIGNIFICANT CHANGE UP (ref 8.4–10.5)
CALCIUM SERPL-MCNC: 8.4 MG/DL — SIGNIFICANT CHANGE UP (ref 8.4–10.5)
CHLORIDE SERPL-SCNC: 104 MMOL/L — SIGNIFICANT CHANGE UP (ref 96–108)
CHLORIDE SERPL-SCNC: 104 MMOL/L — SIGNIFICANT CHANGE UP (ref 96–108)
CO2 SERPL-SCNC: 25 MMOL/L — SIGNIFICANT CHANGE UP (ref 22–31)
CO2 SERPL-SCNC: 25 MMOL/L — SIGNIFICANT CHANGE UP (ref 22–31)
CREAT SERPL-MCNC: 0.6 MG/DL — SIGNIFICANT CHANGE UP (ref 0.5–1.3)
CREAT SERPL-MCNC: 0.62 MG/DL — SIGNIFICANT CHANGE UP (ref 0.5–1.3)
EGFR: 125 ML/MIN/1.73M2 — SIGNIFICANT CHANGE UP
EGFR: 126 ML/MIN/1.73M2 — SIGNIFICANT CHANGE UP
GLUCOSE SERPL-MCNC: 106 MG/DL — HIGH (ref 70–99)
GLUCOSE SERPL-MCNC: 97 MG/DL — SIGNIFICANT CHANGE UP (ref 70–99)
HCT VFR BLD CALC: 29.1 % — LOW (ref 34.5–45)
HCT VFR BLD CALC: 29.3 % — LOW (ref 34.5–45)
HCT VFR BLD CALC: 29.9 % — LOW (ref 34.5–45)
HGB BLD-MCNC: 10.1 G/DL — LOW (ref 11.5–15.5)
HGB BLD-MCNC: 9.8 G/DL — LOW (ref 11.5–15.5)
HGB BLD-MCNC: 9.8 G/DL — LOW (ref 11.5–15.5)
INR BLD: 1.11 RATIO — SIGNIFICANT CHANGE UP (ref 0.85–1.18)
INR BLD: 1.22 RATIO — HIGH (ref 0.85–1.18)
MAGNESIUM SERPL-MCNC: 2.1 MG/DL — SIGNIFICANT CHANGE UP (ref 1.6–2.6)
MAGNESIUM SERPL-MCNC: 2.2 MG/DL — SIGNIFICANT CHANGE UP (ref 1.6–2.6)
MCHC RBC-ENTMCNC: 28.7 PG — SIGNIFICANT CHANGE UP (ref 27–34)
MCHC RBC-ENTMCNC: 29.2 PG — SIGNIFICANT CHANGE UP (ref 27–34)
MCHC RBC-ENTMCNC: 29.9 PG — SIGNIFICANT CHANGE UP (ref 27–34)
MCHC RBC-ENTMCNC: 32.8 GM/DL — SIGNIFICANT CHANGE UP (ref 32–36)
MCHC RBC-ENTMCNC: 33.7 GM/DL — SIGNIFICANT CHANGE UP (ref 32–36)
MCHC RBC-ENTMCNC: 34.5 GM/DL — SIGNIFICANT CHANGE UP (ref 32–36)
MCV RBC AUTO: 86.6 FL — SIGNIFICANT CHANGE UP (ref 80–100)
MCV RBC AUTO: 86.7 FL — SIGNIFICANT CHANGE UP (ref 80–100)
MCV RBC AUTO: 87.4 FL — SIGNIFICANT CHANGE UP (ref 80–100)
NRBC # BLD: 0 /100 WBCS — SIGNIFICANT CHANGE UP (ref 0–0)
PHOSPHATE SERPL-MCNC: 3.1 MG/DL — SIGNIFICANT CHANGE UP (ref 2.5–4.5)
PHOSPHATE SERPL-MCNC: 3.3 MG/DL — SIGNIFICANT CHANGE UP (ref 2.5–4.5)
PLATELET # BLD AUTO: 134 K/UL — LOW (ref 150–400)
PLATELET # BLD AUTO: 136 K/UL — LOW (ref 150–400)
PLATELET # BLD AUTO: 139 K/UL — LOW (ref 150–400)
POTASSIUM SERPL-MCNC: 3.6 MMOL/L — SIGNIFICANT CHANGE UP (ref 3.5–5.3)
POTASSIUM SERPL-MCNC: 4.4 MMOL/L — SIGNIFICANT CHANGE UP (ref 3.5–5.3)
POTASSIUM SERPL-SCNC: 3.6 MMOL/L — SIGNIFICANT CHANGE UP (ref 3.5–5.3)
POTASSIUM SERPL-SCNC: 4.4 MMOL/L — SIGNIFICANT CHANGE UP (ref 3.5–5.3)
PROT SERPL-MCNC: 5.7 G/DL — LOW (ref 6–8.3)
PROT SERPL-MCNC: 5.8 G/DL — LOW (ref 6–8.3)
PROTHROM AB SERPL-ACNC: 12.2 SEC — SIGNIFICANT CHANGE UP (ref 9.5–13)
PROTHROM AB SERPL-ACNC: 12.7 SEC — SIGNIFICANT CHANGE UP (ref 9.5–13)
RBC # BLD: 3.36 M/UL — LOW (ref 3.8–5.2)
RBC # BLD: 3.38 M/UL — LOW (ref 3.8–5.2)
RBC # BLD: 3.42 M/UL — LOW (ref 3.8–5.2)
RBC # FLD: 13.3 % — SIGNIFICANT CHANGE UP (ref 10.3–14.5)
RBC # FLD: 13.3 % — SIGNIFICANT CHANGE UP (ref 10.3–14.5)
RBC # FLD: 13.5 % — SIGNIFICANT CHANGE UP (ref 10.3–14.5)
SODIUM SERPL-SCNC: 140 MMOL/L — SIGNIFICANT CHANGE UP (ref 135–145)
SODIUM SERPL-SCNC: 140 MMOL/L — SIGNIFICANT CHANGE UP (ref 135–145)
WBC # BLD: 6.32 K/UL — SIGNIFICANT CHANGE UP (ref 3.8–10.5)
WBC # BLD: 8.04 K/UL — SIGNIFICANT CHANGE UP (ref 3.8–10.5)
WBC # BLD: 8.85 K/UL — SIGNIFICANT CHANGE UP (ref 3.8–10.5)
WBC # FLD AUTO: 6.32 K/UL — SIGNIFICANT CHANGE UP (ref 3.8–10.5)
WBC # FLD AUTO: 8.04 K/UL — SIGNIFICANT CHANGE UP (ref 3.8–10.5)
WBC # FLD AUTO: 8.85 K/UL — SIGNIFICANT CHANGE UP (ref 3.8–10.5)

## 2024-02-27 PROCEDURE — 71045 X-RAY EXAM CHEST 1 VIEW: CPT | Mod: 26

## 2024-02-27 PROCEDURE — 99232 SBSQ HOSP IP/OBS MODERATE 35: CPT

## 2024-02-27 RX ORDER — DEXTROSE MONOHYDRATE, SODIUM CHLORIDE, AND POTASSIUM CHLORIDE 50; .745; 4.5 G/1000ML; G/1000ML; G/1000ML
1000 INJECTION, SOLUTION INTRAVENOUS
Refills: 0 | Status: DISCONTINUED | OUTPATIENT
Start: 2024-02-27 | End: 2024-02-27

## 2024-02-27 RX ORDER — OXYCODONE HYDROCHLORIDE 5 MG/1
5 TABLET ORAL EVERY 4 HOURS
Refills: 0 | Status: DISCONTINUED | OUTPATIENT
Start: 2024-02-27 | End: 2024-02-29

## 2024-02-27 RX ORDER — CHLORHEXIDINE GLUCONATE 213 G/1000ML
1 SOLUTION TOPICAL
Refills: 0 | Status: DISCONTINUED | OUTPATIENT
Start: 2024-02-27 | End: 2024-02-29

## 2024-02-27 RX ORDER — ENOXAPARIN SODIUM 100 MG/ML
40 INJECTION SUBCUTANEOUS EVERY 24 HOURS
Refills: 0 | Status: DISCONTINUED | OUTPATIENT
Start: 2024-02-27 | End: 2024-02-29

## 2024-02-27 RX ORDER — POLYETHYLENE GLYCOL 3350 17 G/17G
17 POWDER, FOR SOLUTION ORAL DAILY
Refills: 0 | Status: DISCONTINUED | OUTPATIENT
Start: 2024-02-27 | End: 2024-02-29

## 2024-02-27 RX ORDER — AMPICILLIN SODIUM AND SULBACTAM SODIUM 250; 125 MG/ML; MG/ML
3 INJECTION, POWDER, FOR SUSPENSION INTRAMUSCULAR; INTRAVENOUS EVERY 6 HOURS
Refills: 0 | Status: DISCONTINUED | OUTPATIENT
Start: 2024-02-27 | End: 2024-02-29

## 2024-02-27 RX ORDER — POTASSIUM CHLORIDE 20 MEQ
40 PACKET (EA) ORAL ONCE
Refills: 0 | Status: COMPLETED | OUTPATIENT
Start: 2024-02-27 | End: 2024-02-27

## 2024-02-27 RX ORDER — OXYCODONE HYDROCHLORIDE 5 MG/1
10 TABLET ORAL EVERY 4 HOURS
Refills: 0 | Status: DISCONTINUED | OUTPATIENT
Start: 2024-02-27 | End: 2024-02-29

## 2024-02-27 RX ORDER — LIDOCAINE 4 G/100G
1 CREAM TOPICAL EVERY 24 HOURS
Refills: 0 | Status: DISCONTINUED | OUTPATIENT
Start: 2024-02-27 | End: 2024-02-29

## 2024-02-27 RX ORDER — SENNA PLUS 8.6 MG/1
2 TABLET ORAL AT BEDTIME
Refills: 0 | Status: DISCONTINUED | OUTPATIENT
Start: 2024-02-27 | End: 2024-02-29

## 2024-02-27 RX ORDER — HYDROMORPHONE HYDROCHLORIDE 2 MG/ML
0.5 INJECTION INTRAMUSCULAR; INTRAVENOUS; SUBCUTANEOUS
Refills: 0 | Status: DISCONTINUED | OUTPATIENT
Start: 2024-02-27 | End: 2024-02-29

## 2024-02-27 RX ORDER — ACETAMINOPHEN 500 MG
975 TABLET ORAL EVERY 6 HOURS
Refills: 0 | Status: DISCONTINUED | OUTPATIENT
Start: 2024-02-27 | End: 2024-02-28

## 2024-02-27 RX ADMIN — HYDROMORPHONE HYDROCHLORIDE 0.5 MILLIGRAM(S): 2 INJECTION INTRAMUSCULAR; INTRAVENOUS; SUBCUTANEOUS at 03:32

## 2024-02-27 RX ADMIN — OXYCODONE HYDROCHLORIDE 5 MILLIGRAM(S): 5 TABLET ORAL at 08:44

## 2024-02-27 RX ADMIN — LIDOCAINE 1 PATCH: 4 CREAM TOPICAL at 22:28

## 2024-02-27 RX ADMIN — HYDROMORPHONE HYDROCHLORIDE 0.5 MILLIGRAM(S): 2 INJECTION INTRAMUSCULAR; INTRAVENOUS; SUBCUTANEOUS at 10:28

## 2024-02-27 RX ADMIN — OXYCODONE HYDROCHLORIDE 10 MILLIGRAM(S): 5 TABLET ORAL at 00:47

## 2024-02-27 RX ADMIN — CHLORHEXIDINE GLUCONATE 1 APPLICATION(S): 213 SOLUTION TOPICAL at 05:32

## 2024-02-27 RX ADMIN — OXYCODONE HYDROCHLORIDE 10 MILLIGRAM(S): 5 TABLET ORAL at 14:00

## 2024-02-27 RX ADMIN — AMPICILLIN SODIUM AND SULBACTAM SODIUM 200 GRAM(S): 250; 125 INJECTION, POWDER, FOR SUSPENSION INTRAMUSCULAR; INTRAVENOUS at 22:28

## 2024-02-27 RX ADMIN — LIDOCAINE 1 PATCH: 4 CREAM TOPICAL at 13:33

## 2024-02-27 RX ADMIN — AMPICILLIN SODIUM AND SULBACTAM SODIUM 200 GRAM(S): 250; 125 INJECTION, POWDER, FOR SUSPENSION INTRAMUSCULAR; INTRAVENOUS at 13:59

## 2024-02-27 RX ADMIN — DEXTROSE MONOHYDRATE, SODIUM CHLORIDE, AND POTASSIUM CHLORIDE 50 MILLILITER(S): 50; .745; 4.5 INJECTION, SOLUTION INTRAVENOUS at 14:00

## 2024-02-27 RX ADMIN — HYDROMORPHONE HYDROCHLORIDE 0.5 MILLIGRAM(S): 2 INJECTION INTRAMUSCULAR; INTRAVENOUS; SUBCUTANEOUS at 23:42

## 2024-02-27 RX ADMIN — HYDROMORPHONE HYDROCHLORIDE 0.5 MILLIGRAM(S): 2 INJECTION INTRAMUSCULAR; INTRAVENOUS; SUBCUTANEOUS at 03:47

## 2024-02-27 RX ADMIN — OXYCODONE HYDROCHLORIDE 5 MILLIGRAM(S): 5 TABLET ORAL at 20:16

## 2024-02-27 RX ADMIN — OXYCODONE HYDROCHLORIDE 10 MILLIGRAM(S): 5 TABLET ORAL at 15:00

## 2024-02-27 RX ADMIN — OXYCODONE HYDROCHLORIDE 10 MILLIGRAM(S): 5 TABLET ORAL at 06:50

## 2024-02-27 RX ADMIN — DEXTROSE MONOHYDRATE, SODIUM CHLORIDE, AND POTASSIUM CHLORIDE 50 MILLILITER(S): 50; .745; 4.5 INJECTION, SOLUTION INTRAVENOUS at 08:45

## 2024-02-27 RX ADMIN — LIDOCAINE 1 PATCH: 4 CREAM TOPICAL at 07:42

## 2024-02-27 RX ADMIN — Medication 25 MILLIGRAM(S): at 05:32

## 2024-02-27 RX ADMIN — POLYETHYLENE GLYCOL 3350 17 GRAM(S): 17 POWDER, FOR SOLUTION ORAL at 13:59

## 2024-02-27 RX ADMIN — OXYCODONE HYDROCHLORIDE 5 MILLIGRAM(S): 5 TABLET ORAL at 09:45

## 2024-02-27 RX ADMIN — DEXTROSE MONOHYDRATE, SODIUM CHLORIDE, AND POTASSIUM CHLORIDE 50 MILLILITER(S): 50; .745; 4.5 INJECTION, SOLUTION INTRAVENOUS at 00:57

## 2024-02-27 RX ADMIN — Medication 25 MILLIGRAM(S): at 14:03

## 2024-02-27 RX ADMIN — Medication 25 MILLIGRAM(S): at 22:28

## 2024-02-27 RX ADMIN — ENOXAPARIN SODIUM 40 MILLIGRAM(S): 100 INJECTION SUBCUTANEOUS at 14:03

## 2024-02-27 RX ADMIN — Medication 1000 MILLIGRAM(S): at 05:36

## 2024-02-27 RX ADMIN — HYDROMORPHONE HYDROCHLORIDE 0.5 MILLIGRAM(S): 2 INJECTION INTRAMUSCULAR; INTRAVENOUS; SUBCUTANEOUS at 12:40

## 2024-02-27 RX ADMIN — OXYCODONE HYDROCHLORIDE 5 MILLIGRAM(S): 5 TABLET ORAL at 20:40

## 2024-02-27 RX ADMIN — SENNA PLUS 2 TABLET(S): 8.6 TABLET ORAL at 22:28

## 2024-02-27 RX ADMIN — AMPICILLIN SODIUM AND SULBACTAM SODIUM 200 GRAM(S): 250; 125 INJECTION, POWDER, FOR SUSPENSION INTRAMUSCULAR; INTRAVENOUS at 05:21

## 2024-02-27 RX ADMIN — LIDOCAINE 1 PATCH: 4 CREAM TOPICAL at 03:32

## 2024-02-27 RX ADMIN — OXYCODONE HYDROCHLORIDE 10 MILLIGRAM(S): 5 TABLET ORAL at 07:42

## 2024-02-27 RX ADMIN — Medication 400 MILLIGRAM(S): at 05:21

## 2024-02-27 RX ADMIN — Medication 40 MILLIEQUIVALENT(S): at 03:32

## 2024-02-27 NOTE — PROGRESS NOTE ADULT - SUBJECTIVE AND OBJECTIVE BOX
TRAUMA SURGERY DAILY PROGRESS NOTE    24 Hour/Overnight Events: No acute events overnight    SUBJECTIVE: Patient seen and evaluated on AM rounds. Pt reports unchanged left hand pain, which is adequately controlled on current regimen. Denies fevers/chills, chest pain, dyspnea, abdominal pain, nausea, vomiting, and diarrhea    ------------------------------------------------------------------------------------------------------------  OBJECTIVE:  Vital Signs Last 24 Hrs  T(C): 36 (27 Feb 2024 07:00), Max: 36.8 (26 Feb 2024 11:00)  T(F): 96.8 (27 Feb 2024 07:00), Max: 98.3 (26 Feb 2024 11:00)  HR: 56 (27 Feb 2024 07:00) (56 - 94)  BP: 137/65 (26 Feb 2024 19:45) (137/65 - 137/65)  BP(mean): 93 (26 Feb 2024 19:45) (93 - 93)  RR: 19 (27 Feb 2024 07:00) (15 - 35)  SpO2: 99% (27 Feb 2024 07:00) (94% - 100%)    Parameters below as of 27 Feb 2024 07:00  Patient On (Oxygen Delivery Method): room air      I&O's Detail    26 Feb 2024 07:01  -  27 Feb 2024 07:00  --------------------------------------------------------  IN:    dextrose 5% + sodium chloride 0.45% w/ Additives: 350 mL    IV PiggyBack: 200 mL    IV PiggyBack: 600 mL    Oral Fluid: 640 mL  Total IN: 1790 mL    OUT:    Chest Tube (mL): 140 mL    Voided (mL): 2850 mL  Total OUT: 2990 mL    Total NET: -1200 mL      27 Feb 2024 07:01  -  27 Feb 2024 08:52  --------------------------------------------------------  IN:  Total IN: 0 mL    OUT:    Voided (mL): 100 mL  Total OUT: 100 mL    Total NET: -100 mL      LABS:                        9.8    8.04  )-----------( 134      ( 27 Feb 2024 02:41 )             29.9     02-27    140  |  104  |  4<L>  ----------------------------<  106<H>  3.6   |  25  |  0.60    Ca    8.4      27 Feb 2024 02:41  Phos  3.1     02-27  Mg     2.1     02-27    TPro  5.8<L>  /  Alb  3.4  /  TBili  0.2  /  DBili  x   /  AST  23  /  ALT  21  /  AlkPhos  56  02-27    LIVER FUNCTIONS - ( 27 Feb 2024 02:41 )  Alb: 3.4 g/dL / Pro: 5.8 g/dL / ALK PHOS: 56 U/L / ALT: 21 U/L / AST: 23 U/L / GGT: x           PT/INR - ( 27 Feb 2024 02:41 )   PT: 12.7 sec;   INR: 1.22 ratio    PTT - ( 27 Feb 2024 02:41 )  PTT:29.5 sec    Urinalysis Basic - ( 27 Feb 2024 02:41 )  Color: x / Appearance: x / SG: x / pH: x  Gluc: 106 mg/dL / Ketone: x  / Bili: x / Urobili: x   Blood: x / Protein: x / Nitrite: x   Leuk Esterase: x / RBC: x / WBC x   Sq Epi: x / Non Sq Epi: x / Bacteria: x      PHYSICAL EXAM:  General: NAD, laying comfortably in bed  HEENT: Normocephalic, atraumatic, EOMI, PEERLA.  Neck: 3 penetrating wounds to neck base closed c/d/i  Chest: No chest wall tenderness, R posterior chest penetrating wound closed c/d/i. CT in place with serous output.   Cardiac: S1, S2, RRR  Respiratory: Bilateral breath sounds, clear and equal bilaterally  Abdomen:  RUQ penetrating wound closed c/d/i, soft, non-distended, TTP b /l UQ, no rebound, no guarding, no masses palpated  Groin: Normal appearing  Ext: R lateral thigh penetrating wound closed c/d/i, LUE w/ large laceration repaired and ace wrapped. palp radial b/l UE, b/l DP palp in Lower Extrem. motor and sensory grossly intact in all 4 extremities  Back: no TTP, no palpable runoff/stepoff/deformity TRAUMA SURGERY DAILY PROGRESS NOTE    24 Hour/Overnight Events:   - Pigtail on suction  - lyrica q8h  - f/u morning CXR while sitting up  - D5 1/2 NS    SUBJECTIVE: Patient seen and evaluated on AM rounds. Pt reports unchanged left hand pain, which is adequately controlled on current regimen. Denies fevers/chills, chest pain, dyspnea, abdominal pain, nausea, vomiting, and diarrhea    ------------------------------------------------------------------------------------------------------------  OBJECTIVE:  Vital Signs Last 24 Hrs  T(C): 36 (27 Feb 2024 07:00), Max: 36.8 (26 Feb 2024 11:00)  T(F): 96.8 (27 Feb 2024 07:00), Max: 98.3 (26 Feb 2024 11:00)  HR: 56 (27 Feb 2024 07:00) (56 - 94)  BP: 137/65 (26 Feb 2024 19:45) (137/65 - 137/65)  BP(mean): 93 (26 Feb 2024 19:45) (93 - 93)  RR: 19 (27 Feb 2024 07:00) (15 - 35)  SpO2: 99% (27 Feb 2024 07:00) (94% - 100%)    Parameters below as of 27 Feb 2024 07:00  Patient On (Oxygen Delivery Method): room air      I&O's Detail    26 Feb 2024 07:01  -  27 Feb 2024 07:00  --------------------------------------------------------  IN:    dextrose 5% + sodium chloride 0.45% w/ Additives: 350 mL    IV PiggyBack: 200 mL    IV PiggyBack: 600 mL    Oral Fluid: 640 mL  Total IN: 1790 mL    OUT:    Chest Tube (mL): 140 mL    Voided (mL): 2850 mL  Total OUT: 2990 mL    Total NET: -1200 mL      27 Feb 2024 07:01  -  27 Feb 2024 08:52  --------------------------------------------------------  IN:  Total IN: 0 mL    OUT:    Voided (mL): 100 mL  Total OUT: 100 mL    Total NET: -100 mL      LABS:                        9.8    8.04  )-----------( 134      ( 27 Feb 2024 02:41 )             29.9     02-27    140  |  104  |  4<L>  ----------------------------<  106<H>  3.6   |  25  |  0.60    Ca    8.4      27 Feb 2024 02:41  Phos  3.1     02-27  Mg     2.1     02-27    TPro  5.8<L>  /  Alb  3.4  /  TBili  0.2  /  DBili  x   /  AST  23  /  ALT  21  /  AlkPhos  56  02-27    LIVER FUNCTIONS - ( 27 Feb 2024 02:41 )  Alb: 3.4 g/dL / Pro: 5.8 g/dL / ALK PHOS: 56 U/L / ALT: 21 U/L / AST: 23 U/L / GGT: x           PT/INR - ( 27 Feb 2024 02:41 )   PT: 12.7 sec;   INR: 1.22 ratio    PTT - ( 27 Feb 2024 02:41 )  PTT:29.5 sec    Urinalysis Basic - ( 27 Feb 2024 02:41 )  Color: x / Appearance: x / SG: x / pH: x  Gluc: 106 mg/dL / Ketone: x  / Bili: x / Urobili: x   Blood: x / Protein: x / Nitrite: x   Leuk Esterase: x / RBC: x / WBC x   Sq Epi: x / Non Sq Epi: x / Bacteria: x      PHYSICAL EXAM:  General: NAD, laying comfortably in bed  HEENT: Normocephalic, atraumatic, EOMI, PEERLA.  Neck: 3 penetrating wounds to neck base closed c/d/i  Chest: No chest wall tenderness, R posterior chest penetrating wound closed c/d/i. CT in place with serous output.   Cardiac: S1, S2, RRR  Respiratory: Bilateral breath sounds, clear and equal bilaterally  Abdomen:  RUQ penetrating wound closed c/d/i, soft, non-distended, TTP b /l UQ, no rebound, no guarding, no masses palpated  Groin: Normal appearing  Ext: R lateral thigh penetrating wound closed c/d/i, LUE w/ large laceration repaired and ace wrapped. palp radial b/l UE, b/l DP palp in Lower Extrem. motor and sensory grossly intact in all 4 extremities  Back: no TTP, no palpable runoff/stepoff/deformity
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
## 2024-02-27 NOTE — PROGRESS NOTE ADULT - SUBJECTIVE AND OBJECTIVE BOX
24 HOUR EVENTS:  - Pigtail on suction  - lyrica q8h  - RTOR 2/27 AM for hand laceration  - f/u morning CXR while sitting up  - D5 1/2 NS     SUBJECTIVE/ROS:  [x] A ten-point review of systems was otherwise negative except as noted.  [ ] Due to altered mental status/intubation, subjective information were not able to be obtained from the patient. History was obtained, to the extent possible, from review of the chart and collateral sources of information.      NEURO  Exam: awake, alert, oriented  Meds: acetaminophen   IVPB .. 1000 milliGRAM(s) IV Intermittent every 6 hours  HYDROmorphone  Injectable 0.5 milliGRAM(s) IV Push every 3 hours PRN breakthrough pain  oxyCODONE    IR 10 milliGRAM(s) Oral every 4 hours PRN Severe Pain (7 - 10)  oxyCODONE    IR 5 milliGRAM(s) Oral every 4 hours PRN Moderate Pain (4 - 6)  pregabalin 25 milliGRAM(s) Oral every 8 hours    [x] Adequacy of sedation and pain control has been assessed and adjusted      RESPIRATORY  RR: 16 (02-27-24 @ 01:00) (16 - 35)  SpO2: 96% (02-27-24 @ 01:00) (92% - 100%)  Wt(kg): --  Exam: unlabored, clear to auscultation bilaterally  Mechanical Ventilation:   ABG - ( 25 Feb 2024 10:10 )  pH: 7.39  /  pCO2: 41    /  pO2: 79    / HCO3: 25    / Base Excess: -0.2  /  SaO2: 97.2    Lactate: x          CARDIOVASCULAR  HR: 67 (02-27-24 @ 01:00) (61 - 94)  BP: 137/65 (02-26-24 @ 19:45) (137/65 - 137/65)  BP(mean): 93 (02-26-24 @ 19:45) (93 - 93)  ABP: 125/67 (02-27-24 @ 01:00) (102/53 - 154/86)  ABP(mean): 92 (02-27-24 @ 01:00) (72 - 117)  Wt(kg): --  CVP(cm H2O): --  VBG - ( 25 Feb 2024 01:56 )  pH: 7.25  /  pCO2: 46    /  pO2: 46    / HCO3: 20    / Base Excess: -7.0  /  SaO2: 75.7   Lactate: 5.4                Exam:  Cardiac Rhythm:  Perfusion     [ x]Adequate   [ ]Inadequate  Mentation   [ x]Normal       [ ]Reduced  Extremities  [ x]Warm         [ ]Cool  Volume Status [ ]Hypervolemic [ x]Euvolemic [ ]Hypovolemic  Meds:       GI/NUTRITION  Exam: abdomen soft, nontender, nondistended  Diet: NPO midnight for RTOR 2/27 AM  Meds: polyethylene glycol 3350 17 Gram(s) Oral daily  senna 2 Tablet(s) Oral at bedtime      GENITOURINARY  I&O's Detail    02-25 @ 07:01 - 02-26 @ 07:00  --------------------------------------------------------  IN:    IV PiggyBack: 100 mL    IV PiggyBack: 587.5 mL    Lactated Ringers: 1200 mL    Oral Fluid: 500 mL  Total IN: 2387.5 mL    OUT:    Chest Tube (mL): 120 mL    Voided (mL): 900 mL  Total OUT: 1020 mL    Total NET: 1367.5 mL      02-26 @ 07:01 - 02-27 @ 01:34  --------------------------------------------------------  IN:    dextrose 5% + sodium chloride 0.45% w/ Additives: 100 mL    IV PiggyBack: 200 mL    IV PiggyBack: 400 mL    Oral Fluid: 640 mL  Total IN: 1340 mL    OUT:    Chest Tube (mL): 115 mL    Voided (mL): 1950 mL  Total OUT: 2065 mL    Total NET: -725 mL          02-26    139  |  104  |  8   ----------------------------<  102<H>  3.7   |  27  |  0.67    Ca    8.2<L>      26 Feb 2024 06:11  Phos  3.4     02-26  Mg     2.0     02-26    TPro  5.2<L>  /  Alb  3.2<L>  /  TBili  0.3  /  DBili  x   /  AST  17  /  ALT  16  /  AlkPhos  57  02-26      Meds: dextrose 5% + sodium chloride 0.45% with potassium chloride 20 mEq/L 1000 milliLiter(s) IV Continuous <Continuous>  phytonadione   Solution 5 milliGRAM(s) Oral daily        HEMATOLOGIC  Meds: enoxaparin Injectable 40 milliGRAM(s) SubCutaneous every 24 hours    [x] VTE Prophylaxis                        9.8    7.30  )-----------( 136      ( 26 Feb 2024 23:27 )             29.5     PT/INR - ( 26 Feb 2024 06:11 )   PT: 14.3 sec;   INR: 1.31 ratio         PTT - ( 26 Feb 2024 06:11 )  PTT:30.4 sec  Transfusion     [ ] PRBC   [ ] Platelets   [ ] FFP   [ ] Cryoprecipitate      INFECTIOUS DISEASES  T(C): 36.7 (02-26-24 @ 23:00), Max: 36.8 (02-26-24 @ 03:00)  Wt(kg): --  WBC Count: 7.30 K/uL (02-26 @ 23:27)  WBC Count: 8.95 K/uL (02-26 @ 15:42)  WBC Count: 9.57 K/uL (02-26 @ 06:11)    Recent Cultures:    Meds: ampicillin/sulbactam  IVPB 3 Gram(s) IV Intermittent every 6 hours  ampicillin/sulbactam  IVPB            ENDOCRINE  Capillary Blood Glucose    Meds:       ACCESS DEVICES:  [x ] Peripheral IV  [ ] Central Venous Line	[ ] R	[ ] L	[ ] IJ	[ ] Fem	[ ] SC	Placed:   [ ] Arterial Line		[ ] R	[ ] L	[ ] Fem	[ ] Rad	[ ] Ax	Placed:   [ ] PICC:					[ ] Mediport  [ ] Urinary Catheter, Date Placed:   [ ] Necessity of urinary, arterial, and venous catheters discussed    OTHER MEDICATIONS:  chlorhexidine 2% Cloths 1 Application(s) Topical <User Schedule>  lidocaine   4% Patch 1 Patch Transdermal every 24 hours      CODE STATUS:     IMAGING:

## 2024-02-27 NOTE — PROGRESS NOTE ADULT - SUBJECTIVE AND OBJECTIVE BOX
Subjective:  "Hello"  Sitting up in bed      V/S  T(C): 36.5 (02-27-24 @ 12:40), Max: 36.8 (02-27-24 @ 03:00)  HR: 93 (02-27-24 @ 12:40) (52 - 94)  BP: 137/65 (02-27-24 @ 09:49) (137/65 - 137/65)  ABP: 126/87 (02-27-24 @ 12:40) (111/51 - 154/86)  ABP(mean): 105 (02-27-24 @ 12:40) (75 - 117)  RR: 18 (02-27-24 @ 12:40) (15 - 29)  SpO2: 99% (02-27-24 @ 12:40) (95% - 100%)      CHEST TUBE:     R pigtail waterseal     serosang fluid draining  AIR LEAKS:  [ ] YES [x ] NO    I&O's Detail    26 Feb 2024 07:01  -  27 Feb 2024 07:00  --------------------------------------------------------  IN:    dextrose 5% + sodium chloride 0.45% w/ Additives: 350 mL    IV PiggyBack: 200 mL    IV PiggyBack: 600 mL    Oral Fluid: 640 mL  Total IN: 1790 mL    OUT:    Chest Tube (mL): 140 mL    Voided (mL): 2850 mL  Total OUT: 2990 mL    Total NET: -1200 mL      27 Feb 2024 07:01  -  27 Feb 2024 14:21  --------------------------------------------------------  IN:    dextrose 5% + sodium chloride 0.45% w/ Additives: 100 mL  Total IN: 100 mL    OUT:    Voided (mL): 800 mL  Total OUT: 800 mL    Total NET: -700 mL          02-26-24 @ 07:01  -  02-27-24 @ 07:00  --------------------------------------------------------  IN: 1790 mL / OUT: 2990 mL / NET: -1200 mL    02-27-24 @ 07:01  -  02-27-24 @ 14:21  --------------------------------------------------------  IN: 100 mL / OUT: 800 mL / NET: -700 mL      MEDICATIONS  (STANDING):  ampicillin/sulbactam  IVPB 3 Gram(s) IV Intermittent every 6 hours  chlorhexidine 2% Cloths 1 Application(s) Topical <User Schedule>  dextrose 5% + sodium chloride 0.45% with potassium chloride 20 mEq/L 1000 milliLiter(s) (50 mL/Hr) IV Continuous <Continuous>  enoxaparin Injectable 40 milliGRAM(s) SubCutaneous every 24 hours  lidocaine   4% Patch 1 Patch Transdermal every 24 hours  polyethylene glycol 3350 17 Gram(s) Oral daily  pregabalin 25 milliGRAM(s) Oral every 8 hours  senna 2 Tablet(s) Oral at bedtime      02-27    x   |  x   |  <4<L>  ----------------------------<  97  x    |  25  |  0.62    Ca    8.4      27 Feb 2024 13:11  Phos  3.3     02-27  Mg     2.2     02-27    TPro  5.7<L>  /  Alb  3.3  /  TBili  0.1<L>  /  DBili  x   /  AST  25  /  ALT  19  /  AlkPhos  56  02-27                               9.8    8.85  )-----------( 139      ( 27 Feb 2024 13:11 )             29.1        PT/INR - ( 27 Feb 2024 13:11 )   PT: 12.2 sec;   INR: 1.11 ratio         PTT - ( 27 Feb 2024 13:11 )  PTT:29.0 sec        Physical Exam:    General: NAD, laying comfortably in bed  Neck: 3 penetrating wounds to neck base closed c/d/i  Chest: No chest wall tenderness, R posterior chest penetrating wound closed c/d/i.  Cardiac: S1, S2, RRR  Respiratory: Bilateral breath sounds, clear and equal bilaterally   R   CT in place with serosang  output. waterseal   Abdomen:  RUQ penetrating wound closed c/d/i, soft, non-distended,   Ext: R lateral thigh penetrating wound closed c/d/i, LUE w/ large laceration repaired and ace wrapped.        PAST MEDICAL & SURGICAL HISTORY:
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
## 2024-02-27 NOTE — PROVIDER CONTACT NOTE (OTHER) - ACTION/TREATMENT ORDERED:
Pt. asessed by Jj SANTACRUZ/Luis Fung/Doe Yo/[place].  UA sent, Pt. encouraged to use I/S, drink fluids/Monitor/PP/RN
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
## 2024-02-28 ENCOUNTER — TRANSCRIPTION ENCOUNTER (OUTPATIENT)
Age: 29
End: 2024-02-28

## 2024-02-28 LAB
ANION GAP SERPL CALC-SCNC: 15 MMOL/L — SIGNIFICANT CHANGE UP (ref 5–17)
BUN SERPL-MCNC: 4 MG/DL — LOW (ref 7–23)
CALCIUM SERPL-MCNC: 9.2 MG/DL — SIGNIFICANT CHANGE UP (ref 8.4–10.5)
CHLORIDE SERPL-SCNC: 103 MMOL/L — SIGNIFICANT CHANGE UP (ref 96–108)
CO2 SERPL-SCNC: 23 MMOL/L — SIGNIFICANT CHANGE UP (ref 22–31)
CREAT SERPL-MCNC: 0.67 MG/DL — SIGNIFICANT CHANGE UP (ref 0.5–1.3)
EGFR: 123 ML/MIN/1.73M2 — SIGNIFICANT CHANGE UP
GLUCOSE SERPL-MCNC: 94 MG/DL — SIGNIFICANT CHANGE UP (ref 70–99)
HCT VFR BLD CALC: 33.4 % — LOW (ref 34.5–45)
HGB BLD-MCNC: 11.1 G/DL — LOW (ref 11.5–15.5)
MAGNESIUM SERPL-MCNC: 2.2 MG/DL — SIGNIFICANT CHANGE UP (ref 1.6–2.6)
MCHC RBC-ENTMCNC: 29.7 PG — SIGNIFICANT CHANGE UP (ref 27–34)
MCHC RBC-ENTMCNC: 33.2 GM/DL — SIGNIFICANT CHANGE UP (ref 32–36)
MCV RBC AUTO: 89.3 FL — SIGNIFICANT CHANGE UP (ref 80–100)
NRBC # BLD: 0 /100 WBCS — SIGNIFICANT CHANGE UP (ref 0–0)
PHOSPHATE SERPL-MCNC: 3.7 MG/DL — SIGNIFICANT CHANGE UP (ref 2.5–4.5)
PLATELET # BLD AUTO: 175 K/UL — SIGNIFICANT CHANGE UP (ref 150–400)
POTASSIUM SERPL-MCNC: 3.9 MMOL/L — SIGNIFICANT CHANGE UP (ref 3.5–5.3)
POTASSIUM SERPL-SCNC: 3.9 MMOL/L — SIGNIFICANT CHANGE UP (ref 3.5–5.3)
RBC # BLD: 3.74 M/UL — LOW (ref 3.8–5.2)
RBC # FLD: 13.2 % — SIGNIFICANT CHANGE UP (ref 10.3–14.5)
SODIUM SERPL-SCNC: 141 MMOL/L — SIGNIFICANT CHANGE UP (ref 135–145)
WBC # BLD: 14.41 K/UL — HIGH (ref 3.8–10.5)
WBC # FLD AUTO: 14.41 K/UL — HIGH (ref 3.8–10.5)

## 2024-02-28 PROCEDURE — 71045 X-RAY EXAM CHEST 1 VIEW: CPT | Mod: 26,77

## 2024-02-28 PROCEDURE — 99232 SBSQ HOSP IP/OBS MODERATE 35: CPT

## 2024-02-28 PROCEDURE — 71045 X-RAY EXAM CHEST 1 VIEW: CPT | Mod: 26

## 2024-02-28 RX ORDER — POTASSIUM CHLORIDE 20 MEQ
10 PACKET (EA) ORAL ONCE
Refills: 0 | Status: COMPLETED | OUTPATIENT
Start: 2024-02-28 | End: 2024-02-28

## 2024-02-28 RX ORDER — METHOCARBAMOL 500 MG/1
750 TABLET, FILM COATED ORAL ONCE
Refills: 0 | Status: COMPLETED | OUTPATIENT
Start: 2024-02-28 | End: 2024-02-28

## 2024-02-28 RX ORDER — ACETAMINOPHEN 500 MG
975 TABLET ORAL EVERY 6 HOURS
Refills: 0 | Status: DISCONTINUED | OUTPATIENT
Start: 2024-02-28 | End: 2024-02-29

## 2024-02-28 RX ORDER — GABAPENTIN 400 MG/1
300 CAPSULE ORAL ONCE
Refills: 0 | Status: COMPLETED | OUTPATIENT
Start: 2024-02-28 | End: 2024-02-28

## 2024-02-28 RX ORDER — POTASSIUM CHLORIDE 20 MEQ
10 PACKET (EA) ORAL DAILY
Refills: 0 | Status: DISCONTINUED | OUTPATIENT
Start: 2024-02-28 | End: 2024-02-28

## 2024-02-28 RX ADMIN — Medication 25 MILLIGRAM(S): at 05:39

## 2024-02-28 RX ADMIN — AMPICILLIN SODIUM AND SULBACTAM SODIUM 200 GRAM(S): 250; 125 INJECTION, POWDER, FOR SUSPENSION INTRAMUSCULAR; INTRAVENOUS at 23:31

## 2024-02-28 RX ADMIN — Medication 25 MILLIGRAM(S): at 13:24

## 2024-02-28 RX ADMIN — OXYCODONE HYDROCHLORIDE 10 MILLIGRAM(S): 5 TABLET ORAL at 02:06

## 2024-02-28 RX ADMIN — Medication 975 MILLIGRAM(S): at 23:31

## 2024-02-28 RX ADMIN — Medication 975 MILLIGRAM(S): at 11:45

## 2024-02-28 RX ADMIN — OXYCODONE HYDROCHLORIDE 10 MILLIGRAM(S): 5 TABLET ORAL at 10:45

## 2024-02-28 RX ADMIN — OXYCODONE HYDROCHLORIDE 10 MILLIGRAM(S): 5 TABLET ORAL at 11:45

## 2024-02-28 RX ADMIN — Medication 10 MILLIEQUIVALENT(S): at 10:45

## 2024-02-28 RX ADMIN — AMPICILLIN SODIUM AND SULBACTAM SODIUM 200 GRAM(S): 250; 125 INJECTION, POWDER, FOR SUSPENSION INTRAMUSCULAR; INTRAVENOUS at 10:45

## 2024-02-28 RX ADMIN — POLYETHYLENE GLYCOL 3350 17 GRAM(S): 17 POWDER, FOR SOLUTION ORAL at 11:21

## 2024-02-28 RX ADMIN — Medication 975 MILLIGRAM(S): at 17:13

## 2024-02-28 RX ADMIN — OXYCODONE HYDROCHLORIDE 10 MILLIGRAM(S): 5 TABLET ORAL at 23:53

## 2024-02-28 RX ADMIN — LIDOCAINE 1 PATCH: 4 CREAM TOPICAL at 22:36

## 2024-02-28 RX ADMIN — OXYCODONE HYDROCHLORIDE 10 MILLIGRAM(S): 5 TABLET ORAL at 22:37

## 2024-02-28 RX ADMIN — OXYCODONE HYDROCHLORIDE 10 MILLIGRAM(S): 5 TABLET ORAL at 01:06

## 2024-02-28 RX ADMIN — HYDROMORPHONE HYDROCHLORIDE 0.5 MILLIGRAM(S): 2 INJECTION INTRAMUSCULAR; INTRAVENOUS; SUBCUTANEOUS at 00:42

## 2024-02-28 RX ADMIN — AMPICILLIN SODIUM AND SULBACTAM SODIUM 200 GRAM(S): 250; 125 INJECTION, POWDER, FOR SUSPENSION INTRAMUSCULAR; INTRAVENOUS at 05:39

## 2024-02-28 RX ADMIN — LIDOCAINE 1 PATCH: 4 CREAM TOPICAL at 10:16

## 2024-02-28 RX ADMIN — AMPICILLIN SODIUM AND SULBACTAM SODIUM 200 GRAM(S): 250; 125 INJECTION, POWDER, FOR SUSPENSION INTRAMUSCULAR; INTRAVENOUS at 17:13

## 2024-02-28 RX ADMIN — GABAPENTIN 300 MILLIGRAM(S): 400 CAPSULE ORAL at 01:07

## 2024-02-28 RX ADMIN — Medication 25 MILLIGRAM(S): at 22:37

## 2024-02-28 RX ADMIN — Medication 975 MILLIGRAM(S): at 06:39

## 2024-02-28 RX ADMIN — METHOCARBAMOL 750 MILLIGRAM(S): 500 TABLET, FILM COATED ORAL at 03:43

## 2024-02-28 RX ADMIN — Medication 975 MILLIGRAM(S): at 11:21

## 2024-02-28 RX ADMIN — ENOXAPARIN SODIUM 40 MILLIGRAM(S): 100 INJECTION SUBCUTANEOUS at 13:24

## 2024-02-28 RX ADMIN — SENNA PLUS 2 TABLET(S): 8.6 TABLET ORAL at 22:37

## 2024-02-28 RX ADMIN — Medication 975 MILLIGRAM(S): at 05:39

## 2024-02-28 RX ADMIN — CHLORHEXIDINE GLUCONATE 1 APPLICATION(S): 213 SOLUTION TOPICAL at 05:46

## 2024-02-28 NOTE — OCCUPATIONAL THERAPY INITIAL EVALUATION ADULT - ADL RETRAINING, OT EVAL
Pt will be I with toileting within 2 weeks. Pt will be I with UB/LB dressing within 2 weeks.
Pt will be I with toileting within 2 weeks. Pt will be I with UB/LB dressing within 2 weeks.

## 2024-02-28 NOTE — OCCUPATIONAL THERAPY INITIAL EVALUATION ADULT - TRANSFER TRAINING, PT EVAL
Pt will perform all functional transfers with independence within 2 weeks.
Pt will perform all functional transfers with independence within 2 weeks.
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
## 2024-02-28 NOTE — OCCUPATIONAL THERAPY INITIAL EVALUATION ADULT - PLANNED THERAPY INTERVENTIONS, OT EVAL
ADL retraining/transfer training
ADL retraining/balance training/bed mobility training/strengthening/transfer training
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
## 2024-02-28 NOTE — PROGRESS NOTE ADULT - PROBLEM SELECTOR PLAN 1
DC right pigtail   Reviewed  CXR  Trauma following  Thoracic surgery will sign off  Please reconsult if needs arise
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
## 2024-02-28 NOTE — PROVIDER CONTACT NOTE (OTHER) - ACTION/TREATMENT ORDERED:
MD Pedro Infante ordered a one time PO dose of Robaxin 750 mg. No further interventions at this time.

## 2024-02-28 NOTE — DISCHARGE NOTE PROVIDER - CARE PROVIDER_API CALL
Genna Patel  Plastic Surgery  [address] [place] [address], [address]  [place] [address]  Phone: [phone]  Fax: [phone]  Follow Up Time: 1 week   Genna Patel  Plastic Surgery  [address] [place] [address], [address]  [place] [address]  Phone: [phone]  Fax: [phone]  Follow Up Time: 1 week    Anurag Garvin  Phone: (   )    -  Fax: (   )    -  Scheduled Appointment: 03/06/2024

## 2024-02-28 NOTE — DISCHARGE NOTE PROVIDER - CARE PROVIDERS DIRECT ADDRESSES
[email or web address]

## 2024-02-28 NOTE — OCCUPATIONAL THERAPY INITIAL EVALUATION ADULT - MANUAL MUSCLE TESTING RESULTS, REHAB EVAL
grossly 3+/5 BUE and BLE , except L hand/wrist NT
grossly 3+/5 BUE and BLE , except L hand/wrist NT, NWB

## 2024-02-28 NOTE — OCCUPATIONAL THERAPY INITIAL EVALUATION ADULT - GENERAL OBSERVATIONS, REHAB EVAL
Pt received supine in bed with +handcuffs to RUE, +shackles to both ankles, + at bedside, +cardiac monitor, +PIV, +chest tube.
Pt received semi-supine in bed, +IVL, LUE cast

## 2024-02-28 NOTE — DISCHARGE NOTE PROVIDER - NSDCMRMEDTOKEN_GEN_ALL_CORE_FT
acetaminophen 325 mg oral tablet: 3 tab(s) orally every 6 hours  amoxicillin-clavulanate 875 mg-125 mg oral tablet: 875 milligram(s) orally 2 times a day  gabapentin 300 mg oral capsule: 1 cap(s) orally 3 times a day MDD: 3  lidocaine 4% topical film: Apply topically to affected area once a day as needed for  moderate pain Please apply and keep on patch for 12 hours and then keep off for 12 hours.  oxyCODONE 5 mg oral tablet: 1 tab(s) orally every 6 hours as needed for  severe pain MDD: 4  polyethylene glycol 3350 oral powder for reconstitution: 17 gram(s) orally once a day  pregabalin 25 mg oral capsule: 1 cap(s) orally every 8 hours MDD: 3  senna leaf extract oral tablet: 2 tab(s) orally once a day (at bedtime)
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
## 2024-02-28 NOTE — DISCHARGE NOTE PROVIDER - NSDCCPCAREPLAN_GEN_ALL_CORE_FT
PRINCIPAL DISCHARGE DIAGNOSIS  Diagnosis: Laceration of left palm  Assessment and Plan of Treatment: Plastic surgery saw you in the hospital.   You had surgery on 2/27/24, and had a left hand and wrist laceration repair.  MEDICATIONS: You were treated with IV antibiotics in the hospital. You will be discharged with oral antibiotics (Augmentin) for 10 days.   Take your medicine with a glass of water or food as told by your doctor.  Take the medicine as told. Finish them even if you start to feel better.  Do not give your medicine to other people.  Do not use your medicine in the future for a different infection.  Ask your doctor about which side effects to watch for.  Try not to miss any doses. If you miss a dose, take it as soon as possible  ACTIVITY: Please keep your left hand in the splint clean, dry, and elevated.  FOLLOW UP: Please follow up with Dr. Patel (plastic surgery) in 10 days after discharge from the hospital      SECONDARY DISCHARGE DIAGNOSES  Diagnosis: Rib fracture  Assessment and Plan of Treatment: Rib fractures take time to heal on their own in 1-3 months. The longer healing period is often associated with a continued cough or other aggravating activities.  You don't require any necessary follow up unless you have questions or concerns. If you have any questions related to your hospital stay or desire an appointment, please call the acute care surgery office at [phone].  You may take over the counter Tylenol and Motrin for mild to moderate pain.  Please stagger these medications.  Please be sure not exceed 4000mg of acetaminophen (Tylenol) in a 24 hour period. You may take narcotic pain medication for breakthrough (severe) pain not relieved with medications. Topical Salanpas lidocaine patch 1% can be applied to chest wall for topical pain relief. You may purchase a lidocaine patch at your local pharmacy over the counter. If you utilize narcotics, please take over the counter Miralax, Senna, or Colace to prevent constipation.  Use your incentive spirometer 10 times every hour for deep breathing exercises and to keep your lungs healthy.  If you experience fever > 101, pain not controlled with oxycodone, persistent nausea/vomiting, please call your surgeon or return to emergency room immediately.  Please follow up with your primary care doctor within 1-2 weeks of discharge from the hospital.      Diagnosis: Pneumothorax  Assessment and Plan of Treatment: A right chest tube was placed on 2/25, and was removed on 2/28.  Thoracic surgery saw you in the hospital. You did not require surgery    Diagnosis: Fracture of thoracic transverse process  Assessment and Plan of Treatment: Spine surgery was consulted, and you did not require surgery. You also do not need a brace or any other activity restrictions     PRINCIPAL DISCHARGE DIAGNOSIS  Diagnosis: Laceration of left palm  Assessment and Plan of Treatment: Plastic surgery saw you in the hospital.   You had surgery on 2/27/24, and had a left hand and wrist laceration repair.  MEDICATIONS: You were treated with IV antibiotics in the hospital. You will be discharged with oral antibiotics (Augmentin) for 10 days.   Take your medicine with a glass of water or food as told by your doctor.  Take the medicine as told. Finish them even if you start to feel better.  Do not give your medicine to other people.  Do not use your medicine in the future for a different infection.  Ask your doctor about which side effects to watch for.  Try not to miss any doses. If you miss a dose, take it as soon as possible  ACTIVITY: Please keep your left hand in the splint clean, dry, and elevated.  FOLLOW UP: Please follow up with Dr. Patel (plastic surgery) next week after discharge from the hospital      SECONDARY DISCHARGE DIAGNOSES  Diagnosis: Pneumothorax  Assessment and Plan of Treatment: A right chest tube was placed on 2/25, and was removed on 2/28.  Thoracic surgery saw you in the hospital. You did not require surgery. Change dressing daily and keep clean and dry.   WOUND CARE: Remove outer dressing prior to shower.  You may shower. Do not scrub incision. Pat Dry area. Sutures need to be removed 3/6.  BATHING: You may shower and/or sponge bathe 24 hours after surgery. Do no submerge the incision underwater for the next 2 weeks.  ACTIVITY: No heavy lifting anything more than 10-15lbs or straining. Otherwise, you may return to your usual level of physical activity. If you are taking narcotic pain medication (such as Oxycodone) do NOT drive a car, operate machinery or make important decisions.  PAIN: A prescription for oxycodone has been sent to the pharmacy. You should only take these for severe pain. For mild or moderate pain, you may take 975mg of tylenol every 6 hours. Do not exceed more than 4G tylenol per day.   NOTIFY YOUR SURGEON IF: You have any bleeding that does not stop, any pus draining from your wound, any fever (over 100.4 F) or chills, persistent nausea/vomiting with inability to tolerate food or liquids, persistent diarrhea, or if your pain is not controlled on your discharge pain medications.  FOLLOW-UP:  1. Please call to make a follow-up appointment within by next week of discharge with Dr. Garvin for 3/6.  2. Please follow up with your primary care physician in one week regarding your hospitalization.      Diagnosis: Fracture of thoracic transverse process  Assessment and Plan of Treatment: Spine surgery was consulted, and you did not require surgery. You also do not need a brace or any other activity restrictions    Diagnosis: Rib fracture  Assessment and Plan of Treatment: Rib fractures take time to heal on their own in 1-3 months. The longer healing period is often associated with a continued cough or other aggravating activities.  You don't require any necessary follow up unless you have questions or concerns. If you have any questions related to your hospital stay or desire an appointment, please call the acute care surgery office at [phone].  You may take over the counter Tylenol and Motrin for mild to moderate pain.  Please stagger these medications.  Please be sure not exceed 4000mg of acetaminophen (Tylenol) in a 24 hour period. You may take narcotic pain medication for breakthrough (severe) pain not relieved with medications. Topical Salanpas lidocaine patch 1% can be applied to chest wall for topical pain relief. You may purchase a lidocaine patch at your local pharmacy over the counter. If you utilize narcotics, please take over the counter Miralax, Senna, or Colace to prevent constipation.  Use your incentive spirometer 10 times every hour for deep breathing exercises and to keep your lungs healthy.  If you experience fever > 101, pain not controlled with oxycodone, persistent nausea/vomiting, please call your surgeon or return to emergency room immediately.  Please follow up with your primary care doctor within 1-2 weeks of discharge from the hospital.
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
## 2024-02-28 NOTE — DISCHARGE NOTE PROVIDER - PROVIDER TOKENS
PROVIDER:[TOKEN:[[id number]],FOLLOWUP:[1 week]] PROVIDER:[TOKEN:[[id number]],FOLLOWUP:[1 week]],FREE:[LAST:[Bharathi],FIRST:[Anurag],PHONE:[(   )    -],FAX:[(   )    -],SCHEDULEDAPPT:[03/06/2024]]

## 2024-02-28 NOTE — PROVIDER CONTACT NOTE (OTHER) - REASON
Pt. has elevated BP/Heart Rate/Daughter concerned about Urine color
Pt complaining of burning on her back
Pt complaining of knots and burning in her back
pt reporting pain in the Left thumb

## 2024-02-28 NOTE — PROGRESS NOTE ADULT - SUBJECTIVE AND OBJECTIVE BOX
SURGERY DAILY PROGRESS NOTE    SUBJECTIVE: Patient seen and evaluated on AM rounds. Pain controlled. Tolerating diet.       Overnight Events:  No acute events overnight  -----------------------------------------------------------------------------------------------------------------------------------------------------------------------------------------------------------  OBJECTIVE:  Vital Signs Last 24 Hrs  T(C): 36.6 (28 Feb 2024 08:40), Max: 37.1 (27 Feb 2024 17:00)  T(F): 97.9 (28 Feb 2024 08:40), Max: 98.7 (27 Feb 2024 17:00)  HR: 73 (28 Feb 2024 08:40) (54 - 98)  BP: 110/57 (28 Feb 2024 08:40) (110/57 - 146/93)  BP(mean): 106 (27 Feb 2024 16:00) (106 - 106)  RR: 18 (28 Feb 2024 08:40) (16 - 23)  SpO2: 94% (28 Feb 2024 08:40) (94% - 100%)    Parameters below as of 28 Feb 2024 08:40  Patient On (Oxygen Delivery Method): room air      I&O's Detail    27 Feb 2024 07:01  -  28 Feb 2024 07:00  --------------------------------------------------------  IN:    dextrose 5% + sodium chloride 0.45% w/ Additives: 100 mL    dextrose 5% + sodium chloride 0.45% w/ Additives: 100 mL    Oral Fluid: 980 mL  Total IN: 1180 mL    OUT:    Chest Tube (mL): 25 mL    Voided (mL): 1700 mL  Total OUT: 1725 mL    Total NET: -545 mL        Daily     Daily   MEDICATIONS  (STANDING):  acetaminophen     Tablet .. 975 milliGRAM(s) Oral every 6 hours  ampicillin/sulbactam  IVPB 3 Gram(s) IV Intermittent every 6 hours  chlorhexidine 2% Cloths 1 Application(s) Topical <User Schedule>  enoxaparin Injectable 40 milliGRAM(s) SubCutaneous every 24 hours  lidocaine   4% Patch 1 Patch Transdermal every 24 hours  polyethylene glycol 3350 17 Gram(s) Oral daily  pregabalin 25 milliGRAM(s) Oral every 8 hours  senna 2 Tablet(s) Oral at bedtime    MEDICATIONS  (PRN):  HYDROmorphone  Injectable 0.5 milliGRAM(s) IV Push every 3 hours PRN breakthrough pain  oxyCODONE    IR 5 milliGRAM(s) Oral every 4 hours PRN Moderate Pain (4 - 6)  oxyCODONE    IR 10 milliGRAM(s) Oral every 4 hours PRN Severe Pain (7 - 10)      LABS:                        11.1   14.41 )-----------( 175      ( 28 Feb 2024 07:15 )             33.4     02-28    141  |  103  |  4<L>  ----------------------------<  94  3.9   |  23  |  0.67    Ca    9.2      28 Feb 2024 07:15  Phos  3.7     02-28  Mg     2.2     02-28    TPro  5.7<L>  /  Alb  3.3  /  TBili  0.1<L>  /  DBili  x   /  AST  25  /  ALT  19  /  AlkPhos  56  02-27    PT/INR - ( 27 Feb 2024 13:11 )   PT: 12.2 sec;   INR: 1.11 ratio         PTT - ( 27 Feb 2024 13:11 )  PTT:29.0 sec  Urinalysis Basic - ( 28 Feb 2024 07:15 )    Color: x / Appearance: x / SG: x / pH: x  Gluc: 94 mg/dL / Ketone: x  / Bili: x / Urobili: x   Blood: x / Protein: x / Nitrite: x   Leuk Esterase: x / RBC: x / WBC x   Sq Epi: x / Non Sq Epi: x / Bacteria: x        PHYSICAL EXAM:  General: NAD, laying comfortably in bed  HEENT: Normocephalic, atraumatic, EOMI, PEERLA.  Neck: 3 penetrating wounds to neck base closed c/d/i  Chest: No chest wall tenderness, R posterior chest penetrating wound closed c/d/i. CT in place with serous output, water seal.   Respiratory: Bilateral breath sounds, clear and equal bilaterally  Abdomen:  RUQ penetrating wound closed c/d/i, soft, non-distended, TTP b /l UQ, no rebound, no guarding, no masses palpated  Groin: Normal appearing  Ext: R lateral thigh penetrating wound closed c/d/i, LUE w/ large laceration repaired and ace wrapped. Left hand wrist in ace wrap, dressing c/d/i.

## 2024-02-28 NOTE — OCCUPATIONAL THERAPY INITIAL EVALUATION ADULT - DIAGNOSIS, OT EVAL
decreased Activities of Daily Living & functional mobility skills
Patient with deficits in ADL status and functional mobility due to pain, impaired balance, strength, ROM, coordination
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
## 2024-02-28 NOTE — PROVIDER CONTACT NOTE (OTHER) - ACTION/TREATMENT ORDERED:
MD Pedro Infante stated to administered pt's PRN oxy 10mg PO. MD Infante also ordered a one time dose of 300mg gabapentin PO. No further interventions at this time. MD Pedro Infante assessed pt at bedside and stated to administer pt's PRN oxy 10mg PO. MD Infante also ordered a stat one time dose of 300mg gabapentin PO. No further interventions at this time.

## 2024-02-28 NOTE — PROVIDER CONTACT NOTE (OTHER) - SITUATION
Pt reporting pain and numbness in left thumb under ace and splint.
Pt complaining of burning on her back
Pt has elevated HR - 114, BP - 165/66
Pt complaining of knots and burning in her back

## 2024-02-28 NOTE — PROGRESS NOTE ADULT - SUBJECTIVE AND OBJECTIVE BOX
TRAUMA SURGERY DAILY PROGRESS NOTE    24 Hour/Overnight Events: Pt with back pain, and was given PRN oxycodone 10mg, Robaxin 750mg, and Gabapentin 300mg x 1    SUBJECTIVE: Patient seen and evaluated on AM rounds. Pt is resting comfortably in bed with no complaints. Pt reports appropriate surgical incisional pain, which is adequately controlled on current regimen  Pt passing gas, and having bowel movements. Tolerating diet. Ambulating well.   Denies fevers/chills, chest pain, dyspnea, abdominal pain, nausea, vomiting, and diarrhea    ------------------------------------------------------------------------------------------------------------  OBJECTIVE:  Vital Signs Last 24 Hrs  T(C): 36.6 (28 Feb 2024 08:40), Max: 37.1 (27 Feb 2024 17:00)  T(F): 97.9 (28 Feb 2024 08:40), Max: 98.7 (27 Feb 2024 17:00)  HR: 73 (28 Feb 2024 08:40) (54 - 98)  BP: 110/57 (28 Feb 2024 08:40) (110/57 - 146/93)  BP(mean): 106 (27 Feb 2024 16:00) (106 - 106)  RR: 18 (28 Feb 2024 08:40) (16 - 23)  SpO2: 94% (28 Feb 2024 08:40) (94% - 100%)    Parameters below as of 28 Feb 2024 08:40  Patient On (Oxygen Delivery Method): room air      I&O's Detail    27 Feb 2024 07:01  -  28 Feb 2024 07:00  --------------------------------------------------------  IN:    dextrose 5% + sodium chloride 0.45% w/ Additives: 100 mL    dextrose 5% + sodium chloride 0.45% w/ Additives: 100 mL    Oral Fluid: 980 mL  Total IN: 1180 mL    OUT:    Chest Tube (mL): 25 mL    Voided (mL): 1700 mL  Total OUT: 1725 mL    Total NET: -545 mL          LABS:                        11.1   14.41 )-----------( 175      ( 28 Feb 2024 07:15 )             33.4     02-28    141  |  103  |  4<L>  ----------------------------<  94  3.9   |  23  |  0.67    Ca    9.2      28 Feb 2024 07:15  Phos  3.7     02-28  Mg     2.2     02-28    TPro  5.7<L>  /  Alb  3.3  /  TBili  0.1<L>  /  DBili  x   /  AST  25  /  ALT  19  /  AlkPhos  56  02-27    LIVER FUNCTIONS - ( 27 Feb 2024 13:11 )  Alb: 3.3 g/dL / Pro: 5.7 g/dL / ALK PHOS: 56 U/L / ALT: 19 U/L / AST: 25 U/L / GGT: x           PT/INR - ( 27 Feb 2024 13:11 )   PT: 12.2 sec;   INR: 1.11 ratio         PTT - ( 27 Feb 2024 13:11 )  PTT:29.0 sec  Urinalysis Basic - ( 28 Feb 2024 07:15 )    Color: x / Appearance: x / SG: x / pH: x  Gluc: 94 mg/dL / Ketone: x  / Bili: x / Urobili: x   Blood: x / Protein: x / Nitrite: x   Leuk Esterase: x / RBC: x / WBC x   Sq Epi: x / Non Sq Epi: x / Bacteria: x        PHYSICAL EXAM:  Constitutional: Well developed, well nourished, NAD  Pulmonary: Symmetric chest rise bilaterally, no increased WOB  Gastrointestinal: Abdomen soft, nontender, nondistended, appropriate sx incisional tenderness with dressings c/d/i. No rebound or guarding. NGT with bilious output. ERIN drain w/ serosanguineous output. Ostomy pink with gas and solid stool in ostomy bag. Gravity bag with minimal bilious drainage. Midline wound vac holding suction.   Groin: Soft, nontender, no ecchymosis/hematoma, no erythema, no edema.  Extremities: (+) DP/PT pulses. Warm to touch, soft, normal strength, sensory and motor intact. No cyanosis/erythema/edema    PLAN:   - ERP Protocol  - Wyatt removed; TOV at  - Diet:  - Monitor bowel function  - Serial abd exams   - Antibiotics  - Pain medications PRN  - Monitor NGT  - Monitor ostomy output - ostomy teaching  - Monitor ERIN drain output- ERIN drain teaching  - Monitor wound vac  - Monitor Wyatt  - OOB as tolerated  - VTE ppx:  - PT recs:   - Discharge planning today    TRAUMA SURGERY DAILY PROGRESS NOTE    24 Hour/Overnight Events: Pt with back pain, and was given PRN oxycodone 10mg, Robaxin 750mg, and Gabapentin 300mg x 1    SUBJECTIVE: Patient seen and evaluated on AM rounds. Pt reports appropriate surgical incisional left hand pain, back pain, which is somewhat controlled on current regimen. Tolerating diet. Ambulating well.   Denies fevers/chills, chest pain, dyspnea, abdominal pain, nausea, vomiting, and diarrhea    ------------------------------------------------------------------------------------------------------------  OBJECTIVE:  Vital Signs Last 24 Hrs  T(C): 36.6 (28 Feb 2024 08:40), Max: 37.1 (27 Feb 2024 17:00)  T(F): 97.9 (28 Feb 2024 08:40), Max: 98.7 (27 Feb 2024 17:00)  HR: 73 (28 Feb 2024 08:40) (54 - 98)  BP: 110/57 (28 Feb 2024 08:40) (110/57 - 146/93)  BP(mean): 106 (27 Feb 2024 16:00) (106 - 106)  RR: 18 (28 Feb 2024 08:40) (16 - 23)  SpO2: 94% (28 Feb 2024 08:40) (94% - 100%)    Parameters below as of 28 Feb 2024 08:40  Patient On (Oxygen Delivery Method): room air      I&O's Detail    27 Feb 2024 07:01  -  28 Feb 2024 07:00  --------------------------------------------------------  IN:    dextrose 5% + sodium chloride 0.45% w/ Additives: 100 mL    dextrose 5% + sodium chloride 0.45% w/ Additives: 100 mL    Oral Fluid: 980 mL  Total IN: 1180 mL    OUT:    Chest Tube (mL): 25 mL    Voided (mL): 1700 mL  Total OUT: 1725 mL    Total NET: -545 mL          LABS:                        11.1   14.41 )-----------( 175      ( 28 Feb 2024 07:15 )             33.4     02-28    141  |  103  |  4<L>  ----------------------------<  94  3.9   |  23  |  0.67    Ca    9.2      28 Feb 2024 07:15  Phos  3.7     02-28  Mg     2.2     02-28    TPro  5.7<L>  /  Alb  3.3  /  TBili  0.1<L>  /  DBili  x   /  AST  25  /  ALT  19  /  AlkPhos  56  02-27    LIVER FUNCTIONS - ( 27 Feb 2024 13:11 )  Alb: 3.3 g/dL / Pro: 5.7 g/dL / ALK PHOS: 56 U/L / ALT: 19 U/L / AST: 25 U/L / GGT: x           PT/INR - ( 27 Feb 2024 13:11 )   PT: 12.2 sec;   INR: 1.11 ratio         PTT - ( 27 Feb 2024 13:11 )  PTT:29.0 sec  Urinalysis Basic - ( 28 Feb 2024 07:15 )    Color: x / Appearance: x / SG: x / pH: x  Gluc: 94 mg/dL / Ketone: x  / Bili: x / Urobili: x   Blood: x / Protein: x / Nitrite: x   Leuk Esterase: x / RBC: x / WBC x   Sq Epi: x / Non Sq Epi: x / Bacteria: x        PHYSICAL EXAM:  Constitutional: Well developed, well nourished, NAD  Pulmonary: Symmetric chest rise bilaterally, no increased WOB  Gastrointestinal: Abdomen soft, nontender, nondistended, appropriate sx incisional tenderness with dressings c/d/i. No rebound or guarding. NGT with bilious output. ERIN drain w/ serosanguineous output. Ostomy pink with gas and solid stool in ostomy bag. Gravity bag with minimal bilious drainage. Midline wound vac holding suction.   Groin: Soft, nontender, no ecchymosis/hematoma, no erythema, no edema.  Extremities: (+) DP/PT pulses. Warm to touch, soft, normal strength, sensory and motor intact. No cyanosis/erythema/edema    PLAN:   - ERP Protocol  - Wyatt removed; TOV at  - Diet:  - Monitor bowel function  - Serial abd exams   - Antibiotics  - Pain medications PRN  - Monitor NGT  - Monitor ostomy output - ostomy teaching  - Monitor ERIN drain output- ERIN drain teaching  - Monitor wound vac  - Monitor Wyatt  - OOB as tolerated  - VTE ppx:  - PT recs:   - Discharge planning today

## 2024-02-28 NOTE — OCCUPATIONAL THERAPY INITIAL EVALUATION ADULT - RANGE OF MOTION EXAMINATION, UPPER EXTREMITY
except L wrist/thumb N/T-casted. L digits II-V AROM WFL, limited by cast/bilateral UE Active ROM was WFL  (within functional limits)
except L hand/wrist NT-currently with +ace wrap/bilateral UE Active ROM was WFL  (within functional limits)

## 2024-02-28 NOTE — PROGRESS NOTE ADULT - SUBJECTIVE AND OBJECTIVE BOX
Subjective: "Hello"  Sitting up in bed           V/S:                    T(F): 98.5 (02-28-24 @ 12:53), Max: 98.7 (02-27-24 @ 17:00)  HR: 109 (02-28-24 @ 12:53) (54 - 109)  BP: 108/51 (02-28-24 @ 12:53) (108/51 - 146/93)  RR: 18 (02-28-24 @ 12:53) (18 - 19)  SpO2: 96% (02-28-24 @ 12:53) (94% - 100%)  Wt(kg): --      LV EF:      Labs:  02-28    141  |  103  |  4<L>  ----------------------------<  94  3.9   |  23  |  0.67    Ca    9.2      28 Feb 2024 07:15  Phos  3.7     02-28  Mg     2.2     02-28    TPro  5.7<L>  /  Alb  3.3  /  TBili  0.1<L>  /  DBili  x   /  AST  25  /  ALT  19  /  AlkPhos  56  02-27                               11.1   14.41 )-----------( 175      ( 28 Feb 2024 07:15 )             33.4        PT/INR - ( 27 Feb 2024 13:11 )   PT: 12.2 sec;   INR: 1.11 ratio         PTT - ( 27 Feb 2024 13:11 )  PTT:29.0 sec     CAPILLARY BLOOD GLUCOSE               CXR:    I&O's Detail    27 Feb 2024 07:01  -  28 Feb 2024 07:00  --------------------------------------------------------  IN:    dextrose 5% + sodium chloride 0.45% w/ Additives: 100 mL    dextrose 5% + sodium chloride 0.45% w/ Additives: 100 mL    Oral Fluid: 980 mL  Total IN: 1180 mL    OUT:    Chest Tube (mL): 25 mL    Voided (mL): 1700 mL  Total OUT: 1725 mL    Total NET: -545 mL      28 Feb 2024 07:01  -  28 Feb 2024 16:49  --------------------------------------------------------  IN:  Total IN: 0 mL    OUT:    Voided (mL): 450 mL  Total OUT: 450 mL    Total NET: -450 mL      BOWEL MOVEMENT:  [ ] YES [ x] NO      Daily     Daily   Medications:  acetaminophen     Tablet .. 975 milliGRAM(s) Oral every 6 hours  ampicillin/sulbactam  IVPB 3 Gram(s) IV Intermittent every 6 hours  chlorhexidine 2% Cloths 1 Application(s) Topical <User Schedule>  enoxaparin Injectable 40 milliGRAM(s) SubCutaneous every 24 hours  HYDROmorphone  Injectable 0.5 milliGRAM(s) IV Push every 3 hours PRN  lidocaine   4% Patch 1 Patch Transdermal every 24 hours  oxyCODONE    IR 5 milliGRAM(s) Oral every 4 hours PRN  oxyCODONE    IR 10 milliGRAM(s) Oral every 4 hours PRN  polyethylene glycol 3350 17 Gram(s) Oral daily  pregabalin 25 milliGRAM(s) Oral every 8 hours  senna 2 Tablet(s) Oral at bedtime        Physical Exam:      General: NAD, laying comfortably in bed  Neck: 3 penetrating wounds to neck base closed c/d/i  Chest: No chest wall tenderness, R posterior chest penetrating wound closed c/d/i.  Cardiac: S1, S2, RRR  Respiratory: Bilateral breath sounds, clear and equal bilaterally   R   CT removed this am by trauma team  Abdomen:  RUQ penetrating wound closed c/d/i, soft, non-distended,   Ext: R lateral thigh penetrating wound closed c/d/i, LUE w/ large laceration repaired and ace wrapped.               PAST MEDICAL & SURGICAL HISTORY:

## 2024-02-28 NOTE — PROVIDER CONTACT NOTE (OTHER) - ACTION/TREATMENT ORDERED:
Pt educated on importance of elevating extremity. Arm elevated on pillow with ice in place. Provider at bedside to assess pt. PRN pain medication given. No further interventions at this time.

## 2024-02-28 NOTE — OCCUPATIONAL THERAPY INITIAL EVALUATION ADULT - BATHING, PREVIOUS LEVEL OF FUNCTION, OT EVAL
Past Medical History:   Diagnosis Date    Diabetes mellitus     GERD (gastroesophageal reflux disease)     Hypertension     Rectal cancer metastasized to lung 01/11/2010       Past Surgical History:   Procedure Laterality Date    COLON SURGERY      COLONOSCOPY N/A 10/3/2017    Procedure: COLONOSCOPY;  Surgeon: Sandoval Bowling MD;  Location: [address]);  Service: Endoscopy;  Laterality: N/A;       Review of patient's allergies indicates:  No Known Allergies    No current facility-administered medications on file prior to encounter.      Current Outpatient Prescriptions on File Prior to Encounter   Medication Sig    ascorbic acid (VITAMIN C) 1000 MG tablet Take 1,000 mg by mouth once daily.    cholecalciferol, vitamin D3, (VITAMIN D3) 2,000 unit Cap Take 1 capsule by mouth once daily.    cholestyramine, bulk, Powd 4 g by Misc.(Non-Drug; Combo Route) route once daily at 6am.    co-enzyme Q-10 30 mg capsule Take 30 mg by mouth 3 (three) times daily.    dronabinol (MARINOL) 10 MG capsule Take 1 capsule (10 mg total) by mouth 2 (two) times daily before meals.    esomeprazole (NEXIUM) 40 MG capsule TAKE 1 CAPSULE(40 MG) BY MOUTH EVERY DAY    furosemide (LASIX) 20 MG tablet Take 2 tablets (40 mg total) by mouth daily as needed.    glipiZIDE (GLUCOTROL) 5 MG tablet TAKE 1 TABLET BY MOUTH THREE TIMES DAILY    metoprolol succinate (TOPROL-XL) 100 MG 24 hr tablet Take 1 tablet (100 mg total) by mouth 2 (two) times daily.    multivitamin with iron-mineral TbSR Take 1 tablet by mouth once daily.    nut.tx.imp.renal fxn,lac-reduc 0.08 gram-1.8 kcal/mL Liqd Take 237 mLs by mouth 2 (two) times daily.    opium tincture 10 mg/mL (morphine) Tinc Take 0.6 mLs (6 mg total) by mouth 4 (four) times daily.    oxycodone-acetaminophen (PERCOCET) 5-325 mg per tablet TK 1 T PO Q 6 H PRN P    paregoric 2 mg/5 mL Liqd TAKE 5 ML BY MOUTH THREE TIMES DAILY    regorafenib (STIVARGA) 40 mg Tab Take 120 mg by mouth once 
daily. Take for 21 days and then 1 week off and then repeat    tamsulosin (FLOMAX) 0.4 mg Cp24 Take 1 capsule (0.4 mg total) by mouth once daily.    diphenoxylate-atropine 2.5-0.025 mg (LOMOTIL) 2.5-0.025 mg per tablet TAKE 1 TABLET BY MOUTH FOUR TIMES DAILY AS NEEDED FOR DIARRHEA    NYSTATIN (DUKE'S SOLUTION) Mix equal amounts of benadryl, maalox, 2% viscous lidocaine and nystatin    Swish and swallow 10 ml 4 times a day    ondansetron (ZOFRAN-ODT) 8 MG TbDL Take 1 tablet (8 mg total) by mouth every 8 (eight) hours as needed (Nausea).     Family History     Problem Relation (Age of Onset)    Breast cancer Mother    Cancer Mother, Father    Tuberculosis Mother        Social History Main Topics    Smoking status: Never Smoker    Smokeless tobacco: Never Used    Alcohol use No    Drug use: Unknown    Sexual activity: Not on file     Review of Systems   All other systems reviewed and are negative.    Objective:     Vital Signs (Most Recent):  Temp: 98.3 °F (36.8 °C) (02/01/18 0925)  Pulse: (!) 132 (02/01/18 1336)  Resp: (!) 22 (02/01/18 1336)  BP: 110/80 (02/01/18 1336)  SpO2: 99 % (02/01/18 1336) Vital Signs (24h Range):  Temp:  [98.2 °F (36.8 °C)-98.3 °F (36.8 °C)] 98.3 °F (36.8 °C)  Pulse:  [119-152] 132  Resp:  [18-28] 22  SpO2:  [98 %-100 %] 99 %  BP: (104-114)/(61-80) 110/80     Weight: 90.7 kg (200 lb)  Body mass index is 28.7 kg/m².    SpO2: 99 %  O2 Device (Oxygen Therapy): room air    Physical Exam  GEN: Alert and oriented in NAD  NECK: + JVD appreciated to ear  CVS: irreg irreg, s1/s2, no MRG appreciated  PULM:rales appreciated  ABD: NT/ND BS +  Extremities: warm and dry, palpable pulses, 3+ edema  NEURO: Alert and oriented x 3  PSYCH: appropriate affect.         Significant Labs:   Recent Lab Results       02/01/18  1010 02/01/18  0818      Immature Granulocytes 0.7(H)      Immature Grans (Abs) 0.05(H) 0.01     Albumin 2.1(L) 2.1(L)     Alkaline Phosphatase 419(H) 424(H)     ALT 13 11     Anion Gap 
9 8     Aniso Slight      AST 17 16     Baso # 0.03      Basophil% 0.4      Total Bilirubin 0.3  Comment:  For infants and newborns, interpretation of results should be based  on gestational age, weight and in agreement with clinical  observations.  Premature Infant recommended reference ranges:  Up to 24 hours.............<8.0 mg/dL  Up to 48 hours............<12.0 mg/dL  3-5 days..................<15.0 mg/dL  6-29 days.................<15.0 mg/dL   0.3  Comment:  For infants and newborns, interpretation of results should be based  on gestational age, weight and in agreement with clinical  observations.  Premature Infant recommended reference ranges:  Up to 24 hours.............<8.0 mg/dL  Up to 48 hours............<12.0 mg/dL  3-5 days..................<15.0 mg/dL  6-29 days.................<15.0 mg/dL         Comment:  Values of less than 100 pg/ml are consistent with non-CHF populations.(H)      BUN, Bld 50(H) 55(H)     Calcium 8.2(L) 8.5(L)     Chloride 111(H) 110     CO2 20(L) 21(L)     Creatinine 2.2(H) 2.2(H)     Differential Method Automated      eGFR if  34.1(A) 34.1(A)     eGFR if non  29.5  Comment:  Calculation used to obtain the estimated glomerular filtration  rate (eGFR) is the CKD-EPI equation.   (A) 29.5  Comment:  Calculation used to obtain the estimated glomerular filtration  rate (eGFR) is the CKD-EPI equation.   (A)     Eos # 0.1      Eosinophil% 1.7      Glucose 112(H) 106     Gran # (ANC) 4.8 4.6  Comment:  The ANC is based on a white cell differential from an   automated cell counter. It has not been microscopically   reviewed for the presence of abnormal cells. Clinical   correlation is required.       Gran% 69.1      Hematocrit 25.3(L) 27.3(L)     Hemoglobin 7.8(L) 8.3(L)     Lymph # 1.2      Lymph% 17.2(L)      MCH 27.7 27.6     MCHC 30.8(L) 30.4(L)     MCV 90 91     Mono # 0.8      Mono% 10.9      MPV SEE COMMENT  Comment:  Result not available. 9.9 
    nRBC 0      Ovalocytes Occasional      Platelet Estimate Clumped(A)      Platelets SEE COMMENT  Comment:  Result not available.  PLATELETS ARE CLUMPED ON SMEAR; APPEARS TO BE ADEQUATE IN NUMBER;   CALLED TO ESTELA URBAN RN   161     Poly Occasional      Potassium 4.2 4.2     Total Protein 5.9(L) 6.1     RBC 2.82(L) 3.01(L)     RDW 20.4(H) 20.6(H)     Sodium 140 139     Troponin I <0.006  Comment:  The reference interval for Troponin I represents the 99th percentile   cutoff   for our facility and is consistent with 3rd generation assay   performance.        WBC 6.97 6.80           Significant Imaging: X-Ray: CXR: X-Ray Chest 1 View (CXR):   Results for orders placed or performed during the hospital encounter of 02/01/18   X-Ray Chest 1 View    Narrative    Comparison: 01/16/2018    Results: Single view. Left-sided PICC is again seen with its tip near the junction of the SVC and right atrium. Somewhat poor inspiration, accentuating the cardiac silhouette and pulmonary vasculature. The heart appears mildly large but unchanged in size and configuration. Tortuous thoracic aorta with atherosclerotic calcification in the wall of the aortic arch. Overall volume loss of both lungs. Multiple pulmonary nodules are again seen bilaterally. These are not as well demonstrated on the current examination due to underpenetration. The mass at the right upper lobe measures approximately 38 mm in greatest dimension on today's study as compared to 33 mm on the previous examination. No definite pleural fluid or pneumothorax. The skeletal structures appear intact.    Impression     Poor inspiration with volume loss bilaterally. Continued bilateral pulmonary nodules which are not as well-demonstrated due to underpenetration. The mass at the right upper lobe measures a larger as compared to the previous study.      Electronically signed by: Dr. FARA FLETCHER MD  Date:     02/01/18  Time:    10:40     EKG: typical atrial flutter  
independent
independent

## 2024-02-28 NOTE — CHART NOTE - NSCHARTNOTEFT_GEN_A_CORE
Pt found to have R T3 transverse process fracture.  - As per Clark et al (J Trauma 2008 doi: 10.1097/TA.[id number]) isolated transverse process factures are not associated with neurologic deficit or structural instability requiring spine service intervention. Conservative management per primary team is therefore appropriate.  - no neurosurgical c/i to participating in PT  - no spine brace indicated  - Please consult neurosurgery as needed if other spinal injuries are found or patient develops neurologic symptoms

## 2024-02-28 NOTE — DISCHARGE NOTE PROVIDER - NSDCFUADDAPPT_GEN_ALL_CORE_FT
Please follow up with PCP in 1-2 weeks after discharge from the hospital Please follow up with PCP in 1-2 weeks after discharge from the hospital    Please call Surgery Clinic to follow up concerning removing sutures***  Please call to make appt for 3/6***    Surgery Clinic  [address], [address]  [place] [address]  Phone- [phone]  Fax- [phone]

## 2024-02-28 NOTE — OCCUPATIONAL THERAPY INITIAL EVALUATION ADULT - LIGHT TOUCH SENSATION, LUE, REHAB EVAL
reports numbness distally from wrist to digits/moderate impairment
reports numbness distally from wrist to digits/moderate impairment

## 2024-02-29 ENCOUNTER — TRANSCRIPTION ENCOUNTER (OUTPATIENT)
Age: 29
End: 2024-02-29

## 2024-02-29 VITALS
TEMPERATURE: 98 F | OXYGEN SATURATION: 95 % | HEART RATE: 60 BPM | RESPIRATION RATE: 18 BRPM | DIASTOLIC BLOOD PRESSURE: 77 MMHG | SYSTOLIC BLOOD PRESSURE: 144 MMHG

## 2024-02-29 LAB
ANION GAP SERPL CALC-SCNC: 9 MMOL/L — SIGNIFICANT CHANGE UP (ref 5–17)
BUN SERPL-MCNC: 7 MG/DL — SIGNIFICANT CHANGE UP (ref 7–23)
CALCIUM SERPL-MCNC: 8.8 MG/DL — SIGNIFICANT CHANGE UP (ref 8.4–10.5)
CHLORIDE SERPL-SCNC: 104 MMOL/L — SIGNIFICANT CHANGE UP (ref 96–108)
CO2 SERPL-SCNC: 29 MMOL/L — SIGNIFICANT CHANGE UP (ref 22–31)
CREAT SERPL-MCNC: 0.65 MG/DL — SIGNIFICANT CHANGE UP (ref 0.5–1.3)
EGFR: 124 ML/MIN/1.73M2 — SIGNIFICANT CHANGE UP
GLUCOSE SERPL-MCNC: 96 MG/DL — SIGNIFICANT CHANGE UP (ref 70–99)
HCT VFR BLD CALC: 29.4 % — LOW (ref 34.5–45)
HGB BLD-MCNC: 9.7 G/DL — LOW (ref 11.5–15.5)
MAGNESIUM SERPL-MCNC: 2 MG/DL — SIGNIFICANT CHANGE UP (ref 1.6–2.6)
MCHC RBC-ENTMCNC: 29.6 PG — SIGNIFICANT CHANGE UP (ref 27–34)
MCHC RBC-ENTMCNC: 33 GM/DL — SIGNIFICANT CHANGE UP (ref 32–36)
MCV RBC AUTO: 89.6 FL — SIGNIFICANT CHANGE UP (ref 80–100)
NRBC # BLD: 0 /100 WBCS — SIGNIFICANT CHANGE UP (ref 0–0)
PHOSPHATE SERPL-MCNC: 3.8 MG/DL — SIGNIFICANT CHANGE UP (ref 2.5–4.5)
PLATELET # BLD AUTO: 176 K/UL — SIGNIFICANT CHANGE UP (ref 150–400)
POTASSIUM SERPL-MCNC: 3.9 MMOL/L — SIGNIFICANT CHANGE UP (ref 3.5–5.3)
POTASSIUM SERPL-SCNC: 3.9 MMOL/L — SIGNIFICANT CHANGE UP (ref 3.5–5.3)
RBC # BLD: 3.28 M/UL — LOW (ref 3.8–5.2)
RBC # FLD: 13.3 % — SIGNIFICANT CHANGE UP (ref 10.3–14.5)
SODIUM SERPL-SCNC: 142 MMOL/L — SIGNIFICANT CHANGE UP (ref 135–145)
WBC # BLD: 8.24 K/UL — SIGNIFICANT CHANGE UP (ref 3.8–10.5)
WBC # FLD AUTO: 8.24 K/UL — SIGNIFICANT CHANGE UP (ref 3.8–10.5)

## 2024-02-29 PROCEDURE — 74177 CT ABD & PELVIS W/CONTRAST: CPT | Mod: MC

## 2024-02-29 PROCEDURE — 70450 CT HEAD/BRAIN W/O DYE: CPT | Mod: MC

## 2024-02-29 PROCEDURE — 97530 THERAPEUTIC ACTIVITIES: CPT

## 2024-02-29 PROCEDURE — 85027 COMPLETE CBC AUTOMATED: CPT

## 2024-02-29 PROCEDURE — 80307 DRUG TEST PRSMV CHEM ANLYZR: CPT

## 2024-02-29 PROCEDURE — 86900 BLOOD TYPING SEROLOGIC ABO: CPT

## 2024-02-29 PROCEDURE — 85730 THROMBOPLASTIN TIME PARTIAL: CPT

## 2024-02-29 PROCEDURE — 73130 X-RAY EXAM OF HAND: CPT

## 2024-02-29 PROCEDURE — 70486 CT MAXILLOFACIAL W/O DYE: CPT | Mod: MC

## 2024-02-29 PROCEDURE — 80053 COMPREHEN METABOLIC PANEL: CPT

## 2024-02-29 PROCEDURE — 84702 CHORIONIC GONADOTROPIN TEST: CPT

## 2024-02-29 PROCEDURE — 82947 ASSAY GLUCOSE BLOOD QUANT: CPT

## 2024-02-29 PROCEDURE — 85014 HEMATOCRIT: CPT

## 2024-02-29 PROCEDURE — 97110 THERAPEUTIC EXERCISES: CPT

## 2024-02-29 PROCEDURE — 83605 ASSAY OF LACTIC ACID: CPT

## 2024-02-29 PROCEDURE — 71045 X-RAY EXAM CHEST 1 VIEW: CPT

## 2024-02-29 PROCEDURE — 71260 CT THORAX DX C+: CPT | Mod: MC

## 2024-02-29 PROCEDURE — 86901 BLOOD TYPING SEROLOGIC RH(D): CPT

## 2024-02-29 PROCEDURE — 76377 3D RENDER W/INTRP POSTPROCES: CPT

## 2024-02-29 PROCEDURE — 82330 ASSAY OF CALCIUM: CPT

## 2024-02-29 PROCEDURE — 83735 ASSAY OF MAGNESIUM: CPT

## 2024-02-29 PROCEDURE — 96374 THER/PROPH/DIAG INJ IV PUSH: CPT

## 2024-02-29 PROCEDURE — 96376 TX/PRO/DX INJ SAME DRUG ADON: CPT

## 2024-02-29 PROCEDURE — 99291 CRITICAL CARE FIRST HOUR: CPT | Mod: 25

## 2024-02-29 PROCEDURE — 82435 ASSAY OF BLOOD CHLORIDE: CPT

## 2024-02-29 PROCEDURE — 90714 TD VACC NO PRESV 7 YRS+ IM: CPT

## 2024-02-29 PROCEDURE — 82803 BLOOD GASES ANY COMBINATION: CPT

## 2024-02-29 PROCEDURE — 97162 PT EVAL MOD COMPLEX 30 MIN: CPT

## 2024-02-29 PROCEDURE — 84295 ASSAY OF SERUM SODIUM: CPT

## 2024-02-29 PROCEDURE — 36430 TRANSFUSION BLD/BLD COMPNT: CPT

## 2024-02-29 PROCEDURE — 84100 ASSAY OF PHOSPHORUS: CPT

## 2024-02-29 PROCEDURE — 84132 ASSAY OF SERUM POTASSIUM: CPT

## 2024-02-29 PROCEDURE — 70498 CT ANGIOGRAPHY NECK: CPT | Mod: MC

## 2024-02-29 PROCEDURE — 97116 GAIT TRAINING THERAPY: CPT

## 2024-02-29 PROCEDURE — 80048 BASIC METABOLIC PNL TOTAL CA: CPT

## 2024-02-29 PROCEDURE — 86850 RBC ANTIBODY SCREEN: CPT

## 2024-02-29 PROCEDURE — P9016: CPT

## 2024-02-29 PROCEDURE — 99223 1ST HOSP IP/OBS HIGH 75: CPT

## 2024-02-29 PROCEDURE — 82565 ASSAY OF CREATININE: CPT

## 2024-02-29 PROCEDURE — 93005 ELECTROCARDIOGRAM TRACING: CPT

## 2024-02-29 PROCEDURE — 36415 COLL VENOUS BLD VENIPUNCTURE: CPT

## 2024-02-29 PROCEDURE — 97168 OT RE-EVAL EST PLAN CARE: CPT

## 2024-02-29 PROCEDURE — 85610 PROTHROMBIN TIME: CPT

## 2024-02-29 PROCEDURE — 70496 CT ANGIOGRAPHY HEAD: CPT | Mod: MC

## 2024-02-29 PROCEDURE — 85018 HEMOGLOBIN: CPT

## 2024-02-29 PROCEDURE — 72125 CT NECK SPINE W/O DYE: CPT | Mod: MC

## 2024-02-29 PROCEDURE — 97166 OT EVAL MOD COMPLEX 45 MIN: CPT

## 2024-02-29 RX ORDER — SENNA PLUS 8.6 MG/1
2 TABLET ORAL
Qty: 0 | Refills: 0 | DISCHARGE
Start: 2024-02-29

## 2024-02-29 RX ORDER — ACETAMINOPHEN 500 MG
3 TABLET ORAL
Qty: 0 | Refills: 0 | DISCHARGE
Start: 2024-02-29

## 2024-02-29 RX ORDER — GABAPENTIN 400 MG/1
300 CAPSULE ORAL THREE TIMES A DAY
Refills: 0 | Status: DISCONTINUED | OUTPATIENT
Start: 2024-02-29 | End: 2024-02-29

## 2024-02-29 RX ORDER — GABAPENTIN 400 MG/1
1 CAPSULE ORAL
Qty: 21 | Refills: 0
Start: 2024-02-29 | End: 2024-03-06

## 2024-02-29 RX ORDER — LIDOCAINE 4 G/100G
1 CREAM TOPICAL
Qty: 0 | Refills: 0 | DISCHARGE
Start: 2024-02-29

## 2024-02-29 RX ORDER — POLYETHYLENE GLYCOL 3350 17 G/17G
17 POWDER, FOR SOLUTION ORAL
Qty: 0 | Refills: 0 | DISCHARGE
Start: 2024-02-29

## 2024-02-29 RX ORDER — OXYCODONE HYDROCHLORIDE 5 MG/1
1 TABLET ORAL
Qty: 7 | Refills: 0
Start: 2024-02-29

## 2024-02-29 RX ADMIN — Medication 975 MILLIGRAM(S): at 06:40

## 2024-02-29 RX ADMIN — AMPICILLIN SODIUM AND SULBACTAM SODIUM 200 GRAM(S): 250; 125 INJECTION, POWDER, FOR SUSPENSION INTRAMUSCULAR; INTRAVENOUS at 11:38

## 2024-02-29 RX ADMIN — LIDOCAINE 1 PATCH: 4 CREAM TOPICAL at 07:35

## 2024-02-29 RX ADMIN — Medication 975 MILLIGRAM(S): at 00:15

## 2024-02-29 RX ADMIN — Medication 975 MILLIGRAM(S): at 18:40

## 2024-02-29 RX ADMIN — Medication 25 MILLIGRAM(S): at 13:59

## 2024-02-29 RX ADMIN — POLYETHYLENE GLYCOL 3350 17 GRAM(S): 17 POWDER, FOR SOLUTION ORAL at 11:45

## 2024-02-29 RX ADMIN — Medication 975 MILLIGRAM(S): at 17:46

## 2024-02-29 RX ADMIN — Medication 975 MILLIGRAM(S): at 12:45

## 2024-02-29 RX ADMIN — ENOXAPARIN SODIUM 40 MILLIGRAM(S): 100 INJECTION SUBCUTANEOUS at 13:59

## 2024-02-29 RX ADMIN — Medication 25 MILLIGRAM(S): at 05:46

## 2024-02-29 RX ADMIN — AMPICILLIN SODIUM AND SULBACTAM SODIUM 200 GRAM(S): 250; 125 INJECTION, POWDER, FOR SUSPENSION INTRAMUSCULAR; INTRAVENOUS at 05:46

## 2024-02-29 RX ADMIN — Medication 975 MILLIGRAM(S): at 05:46

## 2024-02-29 RX ADMIN — Medication 975 MILLIGRAM(S): at 11:45

## 2024-02-29 RX ADMIN — LIDOCAINE 1 PATCH: 4 CREAM TOPICAL at 10:15

## 2024-02-29 RX ADMIN — GABAPENTIN 300 MILLIGRAM(S): 400 CAPSULE ORAL at 13:59

## 2024-02-29 NOTE — DISCHARGE NOTE NURSING/CASE MANAGEMENT/SOCIAL WORK - NSDCVIVACCINE_GEN_ALL_CORE_FT
Td (adult) preservative free; 26-Feb-2024 12:56; Marily Sy (RN); [employer]; [id number] (Exp. Date: 02-Mar-2024); IntraMuscular; Deltoid Right.; 0.5 milliLiter(s); VIS (VIS Published: 06-Aug-2021, VIS Presented: 26-Feb-2024);

## 2024-02-29 NOTE — PROGRESS NOTE ADULT - PROVIDER SPECIALTY LIST ADULT
SICU
Plastic Surgery
Surgery
Thoracic Surgery
Trauma Surgery
SICU
Surgery
Trauma Surgery
Trauma Surgery
CT Surgery
Thoracic Surgery

## 2024-02-29 NOTE — DISCHARGE NOTE NURSING/CASE MANAGEMENT/SOCIAL WORK - NSDCPEFALRISK_GEN_ALL_CORE
For information on Fall & Injury Prevention, visit: [email or web address] OR  [email or web address] OR  https://www.cdc.gov/steadi/patient.html

## 2024-02-29 NOTE — PROGRESS NOTE ADULT - REASON FOR ADMISSION
penetrating trauma, hemothorax

## 2024-02-29 NOTE — PROGRESS NOTE ADULT - SUBJECTIVE AND OBJECTIVE BOX
SURGERY DAILY PROGRESS NOTE:     Overnight Events:  Patient complaining of left thumb pain and numbness overnight.     SUBJECTIVE: Patient seen and evaluated on AM rounds. Pt is resting comfortably in bed. Patient complaining of pain of left thumb and some numbness of fingers. Able to move fingers.     OBJECTIVE:  Vital Signs Last 24 Hrs  T(C): 36.9 (29 Feb 2024 11:56), Max: 37 (28 Feb 2024 16:34)  T(F): 98.5 (29 Feb 2024 11:56), Max: 98.6 (28 Feb 2024 16:34)  HR: 60 (29 Feb 2024 11:56) (57 - 109)  BP: 144/77 (29 Feb 2024 11:56) (104/64 - 144/77)  BP(mean): --  RR: 18 (29 Feb 2024 11:56) (18 - 18)  SpO2: 95% (29 Feb 2024 11:56) (95% - 100%)    Parameters below as of 29 Feb 2024 11:56  Patient On (Oxygen Delivery Method): room air      I&O's Detail    28 Feb 2024 07:01  -  29 Feb 2024 07:00  --------------------------------------------------------  IN:    IV PiggyBack: 100 mL    Oral Fluid: 740 mL  Total IN: 840 mL    OUT:    Voided (mL): 650 mL  Total OUT: 650 mL    Total NET: 190 mL        Daily     Daily     LABS:                        9.7    8.24  )-----------( 176      ( 29 Feb 2024 06:23 )             29.4     02-29    142  |  104  |  7   ----------------------------<  96  3.9   |  29  |  0.65    Ca    8.8      29 Feb 2024 06:23  Phos  3.8     02-29  Mg     2.0     02-29    TPro  5.7<L>  /  Alb  3.3  /  TBili  0.1<L>  /  DBili  x   /  AST  25  /  ALT  19  /  AlkPhos  56  02-27    PT/INR - ( 27 Feb 2024 13:11 )   PT: 12.2 sec;   INR: 1.11 ratio         PTT - ( 27 Feb 2024 13:11 )  PTT:29.0 sec  Urinalysis Basic - ( 29 Feb 2024 06:23 )    Color: x / Appearance: x / SG: x / pH: x  Gluc: 96 mg/dL / Ketone: x  / Bili: x / Urobili: x   Blood: x / Protein: x / Nitrite: x   Leuk Esterase: x / RBC: x / WBC x   Sq Epi: x / Non Sq Epi: x / Bacteria: x          PHYSICAL EXAM:  General: NAD, laying comfortably in bed  HEENT: Normocephalic, atraumatic, EOMI, PEERLA.  Neck: 3 penetrating wounds to neck base closed c/d/i  Chest: No chest wall tenderness, R posterior chest penetrating wound closed c/d/i. Pigtail removed with dressing in place C/d/i  Respiratory: Bilateral breath sounds, clear and equal bilaterally  Abdomen:  RUQ penetrating wound closed c/d/i, soft, non-distended, TTP b /l UQ, no rebound, no guarding, no masses palpated  Groin: Normal appearing  Ext: R lateral thigh penetrating wound closed c/d/i, LUE w/ large laceration repaired and ace wrapped. Left hand wrist in ace wrap, dressing c/d/i. Able to move left hand fingers.

## 2024-02-29 NOTE — DISCHARGE NOTE NURSING/CASE MANAGEMENT/SOCIAL WORK - NSDCFUADDAPPT_GEN_ALL_CORE_FT
Please follow up with PCP in 1-2 weeks after discharge from the hospital    Please call Surgery Clinic to follow up concerning removing sutures***  Please call to make appt for 3/6***    Surgery Clinic  [address], [address]  [place] [address]  Phone- [phone]  Fax- [phone]
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
## 2024-02-29 NOTE — DISCHARGE NOTE NURSING/CASE MANAGEMENT/SOCIAL WORK - PATIENT PORTAL LINK FT
You can access the FollowMyHealth Patient Portal offered by [facility] by registering at the following website: [email or web address]. By joining [employer]’s FollowMyHealth portal, you will also be able to view your health information using other applications (apps) compatible with our system.

## 2024-02-29 NOTE — DISCHARGE NOTE NURSING/CASE MANAGEMENT/SOCIAL WORK - NSDCPEEMAIL_GEN_ALL_CORE
[facility] for Tobacco Control email [email or web address]

## 2024-02-29 NOTE — CHART NOTE - NSCHARTNOTESELECT_GEN_ALL_CORE
Event Note
Neurosurgery/Event Note
Event Note
Hand numbness
Post Op Check
Tertiary Exam
Transfer Note

## 2024-02-29 NOTE — SBIRT NOTE ADULT - NSSBIRTALCNOACTINTDET_GEN_A_CORE
Patient reported that she drinks socially, which can be monthly or less. Patient currently drinking under healthy guidelines.

## 2024-02-29 NOTE — SBIRT NOTE ADULT - NSSBIRTDRGNOACTINTDET_GEN_A_CORE
Patient reported that she used to smoke marijuana but quit a few months ago. No resources requested.

## 2024-02-29 NOTE — DISCHARGE NOTE NURSING/CASE MANAGEMENT/SOCIAL WORK - NSDCPEWEB_GEN_ALL_CORE
[facility] for Tobacco Control website --- [email or web address] website --- [email or web address]

## 2024-02-29 NOTE — CONSULT NOTE ADULT - REASON FOR ADMISSION
penetrating trauma, hemothorax
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
## 2024-02-29 NOTE — CHART NOTE - NSCHARTNOTEFT_GEN_A_CORE
Patient complains of L thumb numbness. I spoke with Dr. Patel, the plastic surgeon who repaired her laceration, who told me that she suffered a complete laceration of the nerve to her thumb, which is causing her diminished sensation and tight feeling.     She should not remove the splint, which would risk re-injuring the repaired nerve.

## 2024-03-12 ENCOUNTER — INPATIENT (INPATIENT)
Facility: HOSPITAL | Age: 29
LOS: 5 days | Discharge: HOME | End: 2024-03-18
Attending: STUDENT IN AN ORGANIZED HEALTH CARE EDUCATION/TRAINING PROGRAM | Admitting: STUDENT IN AN ORGANIZED HEALTH CARE EDUCATION/TRAINING PROGRAM
Payer: MEDICAID

## 2024-03-12 VITALS
OXYGEN SATURATION: 99 % | RESPIRATION RATE: 20 BRPM | HEART RATE: 86 BPM | SYSTOLIC BLOOD PRESSURE: 130 MMHG | DIASTOLIC BLOOD PRESSURE: 71 MMHG | TEMPERATURE: 98 F

## 2024-03-12 LAB
ADD ON TEST-SPECIMEN IN LAB: SIGNIFICANT CHANGE UP
ADD ON TEST-SPECIMEN IN LAB: SIGNIFICANT CHANGE UP
ALBUMIN SERPL ELPH-MCNC: 3.8 G/DL — SIGNIFICANT CHANGE UP (ref 3.3–5)
ALP SERPL-CCNC: 91 U/L — SIGNIFICANT CHANGE UP (ref 40–120)
ALT FLD-CCNC: 46 U/L — HIGH (ref 4–33)
ANION GAP SERPL CALC-SCNC: 11 MMOL/L — SIGNIFICANT CHANGE UP (ref 7–14)
AST SERPL-CCNC: 25 U/L — SIGNIFICANT CHANGE UP (ref 4–32)
BASOPHILS # BLD AUTO: 0.04 K/UL — SIGNIFICANT CHANGE UP (ref 0–0.2)
BASOPHILS NFR BLD AUTO: 0.4 % — SIGNIFICANT CHANGE UP (ref 0–2)
BILIRUB SERPL-MCNC: 0.2 MG/DL — SIGNIFICANT CHANGE UP (ref 0.2–1.2)
BUN SERPL-MCNC: 10 MG/DL — SIGNIFICANT CHANGE UP (ref 7–23)
CALCIUM SERPL-MCNC: 9.2 MG/DL — SIGNIFICANT CHANGE UP (ref 8.4–10.5)
CHLORIDE SERPL-SCNC: 103 MMOL/L — SIGNIFICANT CHANGE UP (ref 98–107)
CO2 SERPL-SCNC: 25 MMOL/L — SIGNIFICANT CHANGE UP (ref 22–31)
CREAT SERPL-MCNC: 0.62 MG/DL — SIGNIFICANT CHANGE UP (ref 0.5–1.3)
EGFR: 125 ML/MIN/1.73M2 — SIGNIFICANT CHANGE UP
EOSINOPHIL # BLD AUTO: 0.07 K/UL — SIGNIFICANT CHANGE UP (ref 0–0.5)
EOSINOPHIL NFR BLD AUTO: 0.7 % — SIGNIFICANT CHANGE UP (ref 0–6)
GLUCOSE SERPL-MCNC: 85 MG/DL — SIGNIFICANT CHANGE UP (ref 70–99)
HCT VFR BLD CALC: 35.9 % — SIGNIFICANT CHANGE UP (ref 34.5–45)
HGB BLD-MCNC: 11.4 G/DL — LOW (ref 11.5–15.5)
IANC: 7.29 K/UL — SIGNIFICANT CHANGE UP (ref 1.8–7.4)
IMM GRANULOCYTES NFR BLD AUTO: 0.7 % — SIGNIFICANT CHANGE UP (ref 0–0.9)
LIDOCAIN IGE QN: 16 U/L — SIGNIFICANT CHANGE UP (ref 7–60)
LYMPHOCYTES # BLD AUTO: 1.52 K/UL — SIGNIFICANT CHANGE UP (ref 1–3.3)
LYMPHOCYTES # BLD AUTO: 15.3 % — SIGNIFICANT CHANGE UP (ref 13–44)
MCHC RBC-ENTMCNC: 28.1 PG — SIGNIFICANT CHANGE UP (ref 27–34)
MCHC RBC-ENTMCNC: 31.8 GM/DL — LOW (ref 32–36)
MCV RBC AUTO: 88.4 FL — SIGNIFICANT CHANGE UP (ref 80–100)
MONOCYTES # BLD AUTO: 0.97 K/UL — HIGH (ref 0–0.9)
MONOCYTES NFR BLD AUTO: 9.7 % — SIGNIFICANT CHANGE UP (ref 2–14)
NEUTROPHILS # BLD AUTO: 7.29 K/UL — SIGNIFICANT CHANGE UP (ref 1.8–7.4)
NEUTROPHILS NFR BLD AUTO: 73.2 % — SIGNIFICANT CHANGE UP (ref 43–77)
NRBC # BLD: 0 /100 WBCS — SIGNIFICANT CHANGE UP (ref 0–0)
NRBC # FLD: 0 K/UL — SIGNIFICANT CHANGE UP (ref 0–0)
PLATELET # BLD AUTO: 332 K/UL — SIGNIFICANT CHANGE UP (ref 150–400)
POTASSIUM SERPL-MCNC: 3.9 MMOL/L — SIGNIFICANT CHANGE UP (ref 3.5–5.3)
POTASSIUM SERPL-SCNC: 3.9 MMOL/L — SIGNIFICANT CHANGE UP (ref 3.5–5.3)
PROT SERPL-MCNC: 7.5 G/DL — SIGNIFICANT CHANGE UP (ref 6–8.3)
RBC # BLD: 4.06 M/UL — SIGNIFICANT CHANGE UP (ref 3.8–5.2)
RBC # FLD: 14 % — SIGNIFICANT CHANGE UP (ref 10.3–14.5)
SODIUM SERPL-SCNC: 139 MMOL/L — SIGNIFICANT CHANGE UP (ref 135–145)
TROPONIN T, HIGH SENSITIVITY RESULT: <6 NG/L — SIGNIFICANT CHANGE UP
WBC # BLD: 9.96 K/UL — SIGNIFICANT CHANGE UP (ref 3.8–10.5)
WBC # FLD AUTO: 9.96 K/UL — SIGNIFICANT CHANGE UP (ref 3.8–10.5)

## 2024-03-12 PROCEDURE — 71046 X-RAY EXAM CHEST 2 VIEWS: CPT | Mod: 26

## 2024-03-12 PROCEDURE — 99285 EMERGENCY DEPT VISIT HI MDM: CPT

## 2024-03-12 RX ORDER — KETOROLAC TROMETHAMINE 30 MG/ML
15 SYRINGE (ML) INJECTION ONCE
Refills: 0 | Status: DISCONTINUED | OUTPATIENT
Start: 2024-03-12 | End: 2024-03-12

## 2024-03-12 RX ORDER — ACETAMINOPHEN 500 MG
975 TABLET ORAL ONCE
Refills: 0 | Status: COMPLETED | OUTPATIENT
Start: 2024-03-12 | End: 2024-03-12

## 2024-03-12 RX ORDER — LIDOCAINE 4 G/100G
1 CREAM TOPICAL ONCE
Refills: 0 | Status: COMPLETED | OUTPATIENT
Start: 2024-03-12 | End: 2024-03-12

## 2024-03-12 RX ADMIN — Medication 975 MILLIGRAM(S): at 19:47

## 2024-03-12 RX ADMIN — Medication 15 MILLIGRAM(S): at 19:47

## 2024-03-12 RX ADMIN — LIDOCAINE 1 PATCH: 4 CREAM TOPICAL at 19:49
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
## 2024-03-12 NOTE — ED PROVIDER NOTE - PROGRESS NOTE DETAILS
Eufemia Banuelos (Rodriguez) PGY3 recurrent moderate, complex pleural effusion. thoracic surgery on call is consulted.

## 2024-03-12 NOTE — ED PROVIDER NOTE - CLINICAL SUMMARY MEDICAL DECISION MAKING FREE TEXT BOX
uncomfortable appearing female presents with chest pressure radiating to the back with recent traumatic pneumothorax concerning for repeat ptx v atelectasis v occult pna v inadequate pain control. breath sounds auscultated in all fields. less likely acs however given description as pressure with sob, will send screening troponin. heart sounds not muffled and VS otherwise wnl, low suspicion for primary cardiac pathology/effusion/pericarditis. aortic pathology unlikely with such a delayed presentation. pulses symmetric. benign abdomen, but given positional substernal pain eval pancreatitis and lytes given clinical evidence of dehydration. screen for dropping h/h, CXR and reassess.
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
## 2024-03-12 NOTE — ED ADULT NURSE REASSESSMENT NOTE - NS ED NURSE REASSESS COMMENT FT1
Report received from day RN, patient resting comfortably in stretcher, breathing even and unlabored. Offers no complaints at this time. instructed to call for assistance. Pending dispo

## 2024-03-12 NOTE — ED PROVIDER NOTE - NS ED ROS FT
Constitutional: no fevers, chills  HEENT: no HA, vision changes, rhinorrhea, sore throat  Cardiac: +chest pain, no palpitations  Respiratory: +SOB, no cough or hemoptysis  GI: no n/v/d, abd pain, bloody or dark stools  : no dysuria, frequency, or hematuria  MSK: no joint pain, neck pain +low back pain  Skin: no rashes, jaundice, pruritis  Neuro: +numbness/tingling from nerve damage related to wounds, unchanged since hospital admission, no weakness, unsteady gait  ROS otherwise neg except per hpi
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
## 2024-03-12 NOTE — ED PROVIDER NOTE - PHYSICAL EXAMINATION
General: non-toxic, uncomfortable appearing  HEENT: NCAT, PERRL, dry mucus membranes   Cardiac: RRR, no murmurs, 2+ peripheral pulses  Resp: CTAB  Abdomen: soft, non-distended, bowel sounds present, no ttp, no rebound or guarding. no organomegaly  Extremities: no peripheral edema, calf tenderness, or leg size discrepancies  Skin: no rashes. multiple suture sites clean dry and intact.   Neuro: AAOx4, 5+motor, diminished sensation to R 5th digit. otherwise sensation grossly intact  Psych: mood and affect appropriate
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
## 2024-03-13 ENCOUNTER — TRANSCRIPTION ENCOUNTER (OUTPATIENT)
Age: 29
End: 2024-03-13

## 2024-03-13 ENCOUNTER — APPOINTMENT (OUTPATIENT)
Dept: TRAUMA SURGERY | Facility: CLINIC | Age: 29
End: 2024-03-13

## 2024-03-13 DIAGNOSIS — J90 PLEURAL EFFUSION, NOT ELSEWHERE CLASSIFIED: ICD-10-CM

## 2024-03-13 PROBLEM — Z00.00 ENCOUNTER FOR PREVENTIVE HEALTH EXAMINATION: Status: ACTIVE | Noted: 2024-03-13

## 2024-03-13 LAB
ANION GAP SERPL CALC-SCNC: 9 MMOL/L — SIGNIFICANT CHANGE UP (ref 7–14)
BASOPHILS # BLD AUTO: 0.04 K/UL — SIGNIFICANT CHANGE UP (ref 0–0.2)
BASOPHILS NFR BLD AUTO: 0.5 % — SIGNIFICANT CHANGE UP (ref 0–2)
BLD GP AB SCN SERPL QL: NEGATIVE — SIGNIFICANT CHANGE UP
BUN SERPL-MCNC: 11 MG/DL — SIGNIFICANT CHANGE UP (ref 7–23)
CALCIUM SERPL-MCNC: 8.7 MG/DL — SIGNIFICANT CHANGE UP (ref 8.4–10.5)
CHLORIDE SERPL-SCNC: 103 MMOL/L — SIGNIFICANT CHANGE UP (ref 98–107)
CO2 SERPL-SCNC: 25 MMOL/L — SIGNIFICANT CHANGE UP (ref 22–31)
CREAT SERPL-MCNC: 0.7 MG/DL — SIGNIFICANT CHANGE UP (ref 0.5–1.3)
EGFR: 121 ML/MIN/1.73M2 — SIGNIFICANT CHANGE UP
EOSINOPHIL # BLD AUTO: 0.08 K/UL — SIGNIFICANT CHANGE UP (ref 0–0.5)
EOSINOPHIL NFR BLD AUTO: 1 % — SIGNIFICANT CHANGE UP (ref 0–6)
GLUCOSE SERPL-MCNC: 101 MG/DL — HIGH (ref 70–99)
HCT VFR BLD CALC: 33.7 % — LOW (ref 34.5–45)
HGB BLD-MCNC: 11.1 G/DL — LOW (ref 11.5–15.5)
IANC: 5.55 K/UL — SIGNIFICANT CHANGE UP (ref 1.8–7.4)
IMM GRANULOCYTES NFR BLD AUTO: 0.5 % — SIGNIFICANT CHANGE UP (ref 0–0.9)
LYMPHOCYTES # BLD AUTO: 1.72 K/UL — SIGNIFICANT CHANGE UP (ref 1–3.3)
LYMPHOCYTES # BLD AUTO: 20.6 % — SIGNIFICANT CHANGE UP (ref 13–44)
MCHC RBC-ENTMCNC: 29.1 PG — SIGNIFICANT CHANGE UP (ref 27–34)
MCHC RBC-ENTMCNC: 32.9 GM/DL — SIGNIFICANT CHANGE UP (ref 32–36)
MCV RBC AUTO: 88.2 FL — SIGNIFICANT CHANGE UP (ref 80–100)
MONOCYTES # BLD AUTO: 0.92 K/UL — HIGH (ref 0–0.9)
MONOCYTES NFR BLD AUTO: 11 % — SIGNIFICANT CHANGE UP (ref 2–14)
NEUTROPHILS # BLD AUTO: 5.55 K/UL — SIGNIFICANT CHANGE UP (ref 1.8–7.4)
NEUTROPHILS NFR BLD AUTO: 66.4 % — SIGNIFICANT CHANGE UP (ref 43–77)
NRBC # BLD: 0 /100 WBCS — SIGNIFICANT CHANGE UP (ref 0–0)
NRBC # FLD: 0 K/UL — SIGNIFICANT CHANGE UP (ref 0–0)
PLATELET # BLD AUTO: 310 K/UL — SIGNIFICANT CHANGE UP (ref 150–400)
POTASSIUM SERPL-MCNC: 3.6 MMOL/L — SIGNIFICANT CHANGE UP (ref 3.5–5.3)
POTASSIUM SERPL-SCNC: 3.6 MMOL/L — SIGNIFICANT CHANGE UP (ref 3.5–5.3)
RBC # BLD: 3.82 M/UL — SIGNIFICANT CHANGE UP (ref 3.8–5.2)
RBC # FLD: 13.7 % — SIGNIFICANT CHANGE UP (ref 10.3–14.5)
RH IG SCN BLD-IMP: POSITIVE — SIGNIFICANT CHANGE UP
SODIUM SERPL-SCNC: 137 MMOL/L — SIGNIFICANT CHANGE UP (ref 135–145)
WBC # BLD: 8.35 K/UL — SIGNIFICANT CHANGE UP (ref 3.8–10.5)
WBC # FLD AUTO: 8.35 K/UL — SIGNIFICANT CHANGE UP (ref 3.8–10.5)

## 2024-03-13 PROCEDURE — 71275 CT ANGIOGRAPHY CHEST: CPT | Mod: 26,MC

## 2024-03-13 PROCEDURE — 99223 1ST HOSP IP/OBS HIGH 75: CPT | Mod: 57

## 2024-03-13 PROCEDURE — 71045 X-RAY EXAM CHEST 1 VIEW: CPT | Mod: 26

## 2024-03-13 RX ORDER — INFLUENZA VIRUS VACCINE 15; 15; 15; 15 UG/.5ML; UG/.5ML; UG/.5ML; UG/.5ML
0.5 SUSPENSION INTRAMUSCULAR ONCE
Refills: 0 | Status: DISCONTINUED | OUTPATIENT
Start: 2024-03-13 | End: 2024-03-13

## 2024-03-13 RX ORDER — ACETAMINOPHEN 500 MG
975 TABLET ORAL ONCE
Refills: 0 | Status: COMPLETED | OUTPATIENT
Start: 2024-03-13 | End: 2024-03-13

## 2024-03-13 RX ORDER — ACETAMINOPHEN 500 MG
650 TABLET ORAL EVERY 6 HOURS
Refills: 0 | Status: ACTIVE | OUTPATIENT
Start: 2024-03-13 | End: 2025-02-09

## 2024-03-13 RX ORDER — HEPARIN SODIUM 5000 [USP'U]/ML
5000 INJECTION INTRAVENOUS; SUBCUTANEOUS EVERY 8 HOURS
Refills: 0 | Status: DISCONTINUED | OUTPATIENT
Start: 2024-03-13 | End: 2024-03-18

## 2024-03-13 RX ORDER — OXYCODONE HYDROCHLORIDE 5 MG/1
5 TABLET ORAL EVERY 6 HOURS
Refills: 0 | Status: ACTIVE | OUTPATIENT
Start: 2024-03-13 | End: 2024-03-20

## 2024-03-13 RX ORDER — IPRATROPIUM/ALBUTEROL SULFATE 18-103MCG
3 AEROSOL WITH ADAPTER (GRAM) INHALATION EVERY 6 HOURS
Refills: 0 | Status: DISCONTINUED | OUTPATIENT
Start: 2024-03-13 | End: 2024-03-18

## 2024-03-13 RX ADMIN — Medication 390 MILLIGRAM(S): at 09:19

## 2024-03-13 RX ADMIN — Medication 3 MILLILITER(S): at 05:13

## 2024-03-13 RX ADMIN — Medication 3 MILLILITER(S): at 11:24

## 2024-03-13 RX ADMIN — OXYCODONE HYDROCHLORIDE 5 MILLIGRAM(S): 5 TABLET ORAL at 05:13

## 2024-03-13 RX ADMIN — OXYCODONE HYDROCHLORIDE 5 MILLIGRAM(S): 5 TABLET ORAL at 23:00

## 2024-03-13 RX ADMIN — Medication 3 MILLILITER(S): at 21:53

## 2024-03-13 RX ADMIN — OXYCODONE HYDROCHLORIDE 5 MILLIGRAM(S): 5 TABLET ORAL at 22:30

## 2024-03-13 RX ADMIN — OXYCODONE HYDROCHLORIDE 5 MILLIGRAM(S): 5 TABLET ORAL at 14:25

## 2024-03-13 RX ADMIN — HEPARIN SODIUM 5000 UNIT(S): 5000 INJECTION INTRAVENOUS; SUBCUTANEOUS at 22:29

## 2024-03-13 RX ADMIN — Medication 975 MILLIGRAM(S): at 09:49
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
## 2024-03-13 NOTE — ED PROCEDURE NOTE - PROCEDURE ADDITIONAL DETAILS
18 sutures were removed from 8 areas as follows:   lateral R shoulder - 3 well healed   R anterior chest 2 well healed x2 lacerations   R forearm 2 well healed x2 lacerations   midthoracic spine 2 well healed   R lateral thigh 3 well healed

## 2024-03-13 NOTE — ED ADULT NURSE REASSESSMENT NOTE - NS ED NURSE REASSESS COMMENT FT1
Patient complaining of 8/10 midsternal chest pain, nonradiating. Patient also complaining of right boob pain. Oxy 5mg PO given. Hot packs provided for comfort. POC ongoing.

## 2024-03-13 NOTE — ED ADULT NURSE REASSESSMENT NOTE - NS ED NURSE REASSESS COMMENT FT1
MD Sammi Joshua aware of 8/10 chest pain, will order Acetaminophen 1000mg IVPB. Thoracic Surgery MD made aware of 8/10 chest pain, will order Acetaminophen 1000mg IVPB.

## 2024-03-13 NOTE — ED ADULT NURSE REASSESSMENT NOTE - NS ED NURSE REASSESS COMMENT FT1
Break RN note- Patient resting quietly in bed, breathing even and nonlabored. No acute distress. Cardiac monitor in place- sinus rhythm. Awaiting admission bed. Patient stable upon exiting the room.

## 2024-03-13 NOTE — PATIENT PROFILE ADULT - FALL HARM RISK - HARM RISK INTERVENTIONS

## 2024-03-13 NOTE — H&P ADULT - NS ATTEND AMEND GEN_ALL_CORE FT
patient with retained hemothorax/loculated pleural effusion - otherwise no respiratory symptoms but pain.   plan to admit  and will tentatively book for OR for robotic right vats, evacuation of hemothorax., decortication - Thursday

## 2024-03-13 NOTE — PATIENT PROFILE ADULT - CAREGIVER ADDRESS
Resting quietly thru the night without c/o pain or signs of distress, ambulatory in room with standby assist, MWH, K+ replaced on previous shift,recheck this am, comfortable, will continue to monitor.
[id number] [place] [address]

## 2024-03-13 NOTE — ED ADULT NURSE REASSESSMENT NOTE - NS ED NURSE REASSESS COMMENT FT1
Patient is AOx4, pain relieved with IVPB tylenol. Patient denies chest pain, palpitations or headache. POC ongoing.

## 2024-03-13 NOTE — H&P ADULT - NSHPLABSRESULTS_GEN_ALL_CORE
< from: CT Angio Chest PE Protocol w/ IV Cont (03.13.24 @ 00:44) >    IMPRESSION:  No pulmonary embolism    Mildly complex, moderate right pleural effusion with adjacent compressive   atelectasis at the right lower lobe.    Mild cardiomegaly.    < end of copied text >

## 2024-03-13 NOTE — ED ADULT NURSE REASSESSMENT NOTE - NS ED NURSE REASSESS COMMENT FT1
Patient resting in stretcher A&Ox4, ambulatory, states ot have moderate chest pain at this time. RR equal and unlabored, remains on cardiac monitor. Patient denies sob, dizziness, N/V/D, abdominal pain, chills at this time. Care plan continued. Comfort measures provided. Safety maintained.

## 2024-03-13 NOTE — ED ADULT NURSE REASSESSMENT NOTE - NS ED NURSE REASSESS COMMENT FT1
Patient resting comfortably in stretcher, breathing even and unlabored. Offers no complaints at this time. Instructed to call for assistance. Pending CTA results.

## 2024-03-13 NOTE — H&P ADULT - NSHPREVIEWOFSYSTEMS_GEN_ALL_CORE
Constitutional: [ ] Fever, [ ] Chills, [ ] Fatigue, [ ]Weight change   HEENT: [ ]Blurred vision,  [ ]Headache, [ ] Vision Changes    Respiratory: [ ]Cough, [ ]Wheezing, [ ] Shortness of breath, [ ] Hemoptysis   Cardiovascular: [x]Chest Pain, [ ]Palpitations, [ ]QUINN, [ ]PND, [ ] Orthopnea  Gastrointestinal: [ ]Abdominal Pain, [ ]Diarrhea, [ ]Constipation, [ ] Nausea, [ ] Vomiting  Extremities: [ ]Swelling, [ ] Joint Pain  Neurologic: [ ]Focal deficit, [ ]Paresthesias, [ ]Syncope  Skin: [ ]Rash, [ ]Ecchymoses, [ ] Wounds, [ ]Lesions
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
## 2024-03-13 NOTE — ED ADULT NURSE REASSESSMENT NOTE - NS ED NURSE REASSESS COMMENT FT1
Patient is AOx4 and able to speak in full sentences. Patient is c/o 8/10 chest pain, non radiating; NSR on cardiac monitor. Patient left arm casted; patient not complaining of any pain at extremity. Respirations even and unlabored; on room air; endorsing intermittent SOB when coughing. Patient vitally stable. Denies dizziness, palpitations, and headache. Patient admitted. POC ongoing, awaiting MD orders. MD paged.

## 2024-03-14 ENCOUNTER — APPOINTMENT (OUTPATIENT)
Dept: THORACIC SURGERY | Facility: HOSPITAL | Age: 29
End: 2024-03-14

## 2024-03-14 LAB
ADD ON TEST-SPECIMEN IN LAB: SIGNIFICANT CHANGE UP
ANION GAP SERPL CALC-SCNC: 11 MMOL/L — SIGNIFICANT CHANGE UP (ref 7–14)
APTT BLD: 30.3 SEC — SIGNIFICANT CHANGE UP (ref 24.5–35.6)
BLD GP AB SCN SERPL QL: NEGATIVE — SIGNIFICANT CHANGE UP
BUN SERPL-MCNC: 8 MG/DL — SIGNIFICANT CHANGE UP (ref 7–23)
CALCIUM SERPL-MCNC: 8.6 MG/DL — SIGNIFICANT CHANGE UP (ref 8.4–10.5)
CHLORIDE SERPL-SCNC: 102 MMOL/L — SIGNIFICANT CHANGE UP (ref 98–107)
CO2 SERPL-SCNC: 23 MMOL/L — SIGNIFICANT CHANGE UP (ref 22–31)
CREAT SERPL-MCNC: 0.63 MG/DL — SIGNIFICANT CHANGE UP (ref 0.5–1.3)
EGFR: 125 ML/MIN/1.73M2 — SIGNIFICANT CHANGE UP
GLUCOSE SERPL-MCNC: 92 MG/DL — SIGNIFICANT CHANGE UP (ref 70–99)
GRAM STN FLD: SIGNIFICANT CHANGE UP
GRAM STN FLD: SIGNIFICANT CHANGE UP
HCG SERPL-ACNC: <1 MIU/ML — SIGNIFICANT CHANGE UP
HCG SERPL-ACNC: <1 MIU/ML — SIGNIFICANT CHANGE UP
HCT VFR BLD CALC: 32.3 % — LOW (ref 34.5–45)
HGB BLD-MCNC: 10.7 G/DL — LOW (ref 11.5–15.5)
INR BLD: 1.19 RATIO — HIGH (ref 0.85–1.18)
MAGNESIUM SERPL-MCNC: 2 MG/DL — SIGNIFICANT CHANGE UP (ref 1.6–2.6)
MCHC RBC-ENTMCNC: 29.3 PG — SIGNIFICANT CHANGE UP (ref 27–34)
MCHC RBC-ENTMCNC: 33.1 GM/DL — SIGNIFICANT CHANGE UP (ref 32–36)
MCV RBC AUTO: 88.5 FL — SIGNIFICANT CHANGE UP (ref 80–100)
NIGHT BLUE STAIN TISS: SIGNIFICANT CHANGE UP
NIGHT BLUE STAIN TISS: SIGNIFICANT CHANGE UP
NRBC # BLD: 0 /100 WBCS — SIGNIFICANT CHANGE UP (ref 0–0)
NRBC # FLD: 0 K/UL — SIGNIFICANT CHANGE UP (ref 0–0)
PHOSPHATE SERPL-MCNC: 3.9 MG/DL — SIGNIFICANT CHANGE UP (ref 2.5–4.5)
PLATELET # BLD AUTO: 319 K/UL — SIGNIFICANT CHANGE UP (ref 150–400)
POTASSIUM SERPL-MCNC: 3.7 MMOL/L — SIGNIFICANT CHANGE UP (ref 3.5–5.3)
POTASSIUM SERPL-SCNC: 3.7 MMOL/L — SIGNIFICANT CHANGE UP (ref 3.5–5.3)
PROTHROM AB SERPL-ACNC: 13.3 SEC — HIGH (ref 9.5–13)
RBC # BLD: 3.65 M/UL — LOW (ref 3.8–5.2)
RBC # FLD: 13.6 % — SIGNIFICANT CHANGE UP (ref 10.3–14.5)
RH IG SCN BLD-IMP: POSITIVE — SIGNIFICANT CHANGE UP
SODIUM SERPL-SCNC: 136 MMOL/L — SIGNIFICANT CHANGE UP (ref 135–145)
SPECIMEN SOURCE: SIGNIFICANT CHANGE UP
WBC # BLD: 8.66 K/UL — SIGNIFICANT CHANGE UP (ref 3.8–10.5)
WBC # FLD AUTO: 8.66 K/UL — SIGNIFICANT CHANGE UP (ref 3.8–10.5)

## 2024-03-14 PROCEDURE — 32652 THORACOSCOPY REM TOTL CORTEX: CPT | Mod: AS,RT

## 2024-03-14 PROCEDURE — 32653 THORACOSCOPY REMOV FB/FIBRIN: CPT | Mod: 59,RT

## 2024-03-14 PROCEDURE — 32653 THORACOSCOPY REMOV FB/FIBRIN: CPT | Mod: AS,RT

## 2024-03-14 PROCEDURE — 71045 X-RAY EXAM CHEST 1 VIEW: CPT | Mod: 26

## 2024-03-14 PROCEDURE — S2900 ROBOTIC SURGICAL SYSTEM: CPT | Mod: NC

## 2024-03-14 PROCEDURE — 32652 THORACOSCOPY REM TOTL CORTEX: CPT | Mod: RT

## 2024-03-14 DEVICE — ARISTA 3GR: Type: IMPLANTABLE DEVICE | Status: FUNCTIONAL

## 2024-03-14 DEVICE — SURGICEL 2 X 14": Type: IMPLANTABLE DEVICE | Status: FUNCTIONAL

## 2024-03-14 DEVICE — CHEST DRAIN THORACIC ARGYLE PVC 28FR STRAIGHT: Type: IMPLANTABLE DEVICE | Status: FUNCTIONAL

## 2024-03-14 RX ORDER — SENNA PLUS 8.6 MG/1
2 TABLET ORAL AT BEDTIME
Refills: 0 | Status: DISCONTINUED | OUTPATIENT
Start: 2024-03-14 | End: 2024-03-18

## 2024-03-14 RX ORDER — NALOXONE HYDROCHLORIDE 4 MG/.1ML
0.1 SPRAY NASAL
Refills: 0 | Status: DISCONTINUED | OUTPATIENT
Start: 2024-03-14 | End: 2024-03-18

## 2024-03-14 RX ORDER — HYDROMORPHONE HYDROCHLORIDE 2 MG/ML
1 INJECTION INTRAMUSCULAR; INTRAVENOUS; SUBCUTANEOUS
Refills: 0 | Status: DISCONTINUED | OUTPATIENT
Start: 2024-03-14 | End: 2024-03-14

## 2024-03-14 RX ORDER — HYDROMORPHONE HYDROCHLORIDE 2 MG/ML
0.5 INJECTION INTRAMUSCULAR; INTRAVENOUS; SUBCUTANEOUS
Refills: 0 | Status: DISCONTINUED | OUTPATIENT
Start: 2024-03-14 | End: 2024-03-14

## 2024-03-14 RX ORDER — POLYETHYLENE GLYCOL 3350 17 G/17G
17 POWDER, FOR SOLUTION ORAL DAILY
Refills: 0 | Status: DISCONTINUED | OUTPATIENT
Start: 2024-03-15 | End: 2024-03-18

## 2024-03-14 RX ORDER — ONDANSETRON 8 MG/1
4 TABLET, FILM COATED ORAL EVERY 6 HOURS
Refills: 0 | Status: DISCONTINUED | OUTPATIENT
Start: 2024-03-14 | End: 2024-03-14

## 2024-03-14 RX ORDER — ONDANSETRON 8 MG/1
4 TABLET, FILM COATED ORAL EVERY 6 HOURS
Refills: 0 | Status: DISCONTINUED | OUTPATIENT
Start: 2024-03-14 | End: 2024-03-18

## 2024-03-14 RX ORDER — ACETAMINOPHEN 500 MG
1000 TABLET ORAL EVERY 6 HOURS
Refills: 0 | Status: COMPLETED | OUTPATIENT
Start: 2024-03-14 | End: 2024-03-15

## 2024-03-14 RX ORDER — HYDROMORPHONE HYDROCHLORIDE 2 MG/ML
30 INJECTION INTRAMUSCULAR; INTRAVENOUS; SUBCUTANEOUS
Refills: 0 | Status: DISCONTINUED | OUTPATIENT
Start: 2024-03-14 | End: 2024-03-15

## 2024-03-14 RX ADMIN — HYDROMORPHONE HYDROCHLORIDE 0.5 MILLIGRAM(S): 2 INJECTION INTRAMUSCULAR; INTRAVENOUS; SUBCUTANEOUS at 14:45

## 2024-03-14 RX ADMIN — Medication 1000 MILLIGRAM(S): at 20:17

## 2024-03-14 RX ADMIN — HYDROMORPHONE HYDROCHLORIDE 30 MILLILITER(S): 2 INJECTION INTRAMUSCULAR; INTRAVENOUS; SUBCUTANEOUS at 15:00

## 2024-03-14 RX ADMIN — HEPARIN SODIUM 5000 UNIT(S): 5000 INJECTION INTRAVENOUS; SUBCUTANEOUS at 14:56

## 2024-03-14 RX ADMIN — Medication 3 MILLILITER(S): at 03:31

## 2024-03-14 RX ADMIN — ONDANSETRON 4 MILLIGRAM(S): 8 TABLET, FILM COATED ORAL at 19:38

## 2024-03-14 RX ADMIN — HYDROMORPHONE HYDROCHLORIDE 0.5 MILLIGRAM(S): 2 INJECTION INTRAMUSCULAR; INTRAVENOUS; SUBCUTANEOUS at 14:30

## 2024-03-14 RX ADMIN — HEPARIN SODIUM 5000 UNIT(S): 5000 INJECTION INTRAVENOUS; SUBCUTANEOUS at 21:39

## 2024-03-14 RX ADMIN — HYDROMORPHONE HYDROCHLORIDE 30 MILLILITER(S): 2 INJECTION INTRAMUSCULAR; INTRAVENOUS; SUBCUTANEOUS at 19:41

## 2024-03-14 RX ADMIN — HEPARIN SODIUM 5000 UNIT(S): 5000 INJECTION INTRAVENOUS; SUBCUTANEOUS at 05:08

## 2024-03-14 RX ADMIN — HYDROMORPHONE HYDROCHLORIDE 30 MILLILITER(S): 2 INJECTION INTRAMUSCULAR; INTRAVENOUS; SUBCUTANEOUS at 17:16

## 2024-03-14 RX ADMIN — Medication 400 MILLIGRAM(S): at 19:47

## 2024-03-14 NOTE — BRIEF OPERATIVE NOTE - COMMENTS
I, Gab Winchester PA-C provided direct first assist support to the surgeon during this surgical procedure. My involvement included positioning, prepping and draping the patient prior to surgery, robotic port placement, robotic docking, robotic instrument insertion and removal, ensuring clear visibility and exposure for the surgeon by using instruments such as retractors, suction and sponges, stapling tissues and vessels, retrieving specimens from the operative field, closing surgical incisions, undocking the robot and dressing wounds.  As well as other tasks as directed by the surgeon.

## 2024-03-14 NOTE — BRIEF OPERATIVE NOTE - NSICDXBRIEFPROCEDURE_GEN_ALL_CORE_FT
PROCEDURES:  Drainage of hemothorax using video-assisted thoracoscopic surgery (VATS) 14-Mar-2024 16:45:27  Gab Winchester  Partial decortication of lung 14-Mar-2024 16:46:06  Gab Winchester  Robotic assisted procedure, thoracoscopic 14-Mar-2024 16:46:15  Gab Winchester

## 2024-03-15 LAB
ANION GAP SERPL CALC-SCNC: 12 MMOL/L — SIGNIFICANT CHANGE UP (ref 7–14)
BUN SERPL-MCNC: 8 MG/DL — SIGNIFICANT CHANGE UP (ref 7–23)
CALCIUM SERPL-MCNC: 8.7 MG/DL — SIGNIFICANT CHANGE UP (ref 8.4–10.5)
CHLORIDE SERPL-SCNC: 101 MMOL/L — SIGNIFICANT CHANGE UP (ref 98–107)
CO2 SERPL-SCNC: 23 MMOL/L — SIGNIFICANT CHANGE UP (ref 22–31)
CREAT SERPL-MCNC: 0.6 MG/DL — SIGNIFICANT CHANGE UP (ref 0.5–1.3)
EGFR: 126 ML/MIN/1.73M2 — SIGNIFICANT CHANGE UP
GLUCOSE SERPL-MCNC: 120 MG/DL — HIGH (ref 70–99)
GRAM STN FLD: SIGNIFICANT CHANGE UP
HCT VFR BLD CALC: 34.9 % — SIGNIFICANT CHANGE UP (ref 34.5–45)
HGB BLD-MCNC: 11.2 G/DL — LOW (ref 11.5–15.5)
MAGNESIUM SERPL-MCNC: 2.2 MG/DL — SIGNIFICANT CHANGE UP (ref 1.6–2.6)
MCHC RBC-ENTMCNC: 28.9 PG — SIGNIFICANT CHANGE UP (ref 27–34)
MCHC RBC-ENTMCNC: 32.1 GM/DL — SIGNIFICANT CHANGE UP (ref 32–36)
MCV RBC AUTO: 89.9 FL — SIGNIFICANT CHANGE UP (ref 80–100)
NIGHT BLUE STAIN TISS: SIGNIFICANT CHANGE UP
NRBC # BLD: 0 /100 WBCS — SIGNIFICANT CHANGE UP (ref 0–0)
NRBC # FLD: 0 K/UL — SIGNIFICANT CHANGE UP (ref 0–0)
PHOSPHATE SERPL-MCNC: 2.9 MG/DL — SIGNIFICANT CHANGE UP (ref 2.5–4.5)
PLATELET # BLD AUTO: 363 K/UL — SIGNIFICANT CHANGE UP (ref 150–400)
POTASSIUM SERPL-MCNC: 4 MMOL/L — SIGNIFICANT CHANGE UP (ref 3.5–5.3)
POTASSIUM SERPL-SCNC: 4 MMOL/L — SIGNIFICANT CHANGE UP (ref 3.5–5.3)
RBC # BLD: 3.88 M/UL — SIGNIFICANT CHANGE UP (ref 3.8–5.2)
RBC # FLD: 13.4 % — SIGNIFICANT CHANGE UP (ref 10.3–14.5)
SODIUM SERPL-SCNC: 136 MMOL/L — SIGNIFICANT CHANGE UP (ref 135–145)
SPECIMEN SOURCE: SIGNIFICANT CHANGE UP
SPECIMEN SOURCE: SIGNIFICANT CHANGE UP
WBC # BLD: 12.52 K/UL — HIGH (ref 3.8–10.5)
WBC # FLD AUTO: 12.52 K/UL — HIGH (ref 3.8–10.5)

## 2024-03-15 PROCEDURE — 93010 ELECTROCARDIOGRAM REPORT: CPT

## 2024-03-15 PROCEDURE — 71045 X-RAY EXAM CHEST 1 VIEW: CPT | Mod: 26

## 2024-03-15 RX ORDER — DORNASE ALFA 1 MG/ML
2.5 SOLUTION RESPIRATORY (INHALATION) DAILY
Refills: 0 | Status: DISCONTINUED | OUTPATIENT
Start: 2024-03-15 | End: 2024-03-18

## 2024-03-15 RX ORDER — KETOROLAC TROMETHAMINE 30 MG/ML
30 SYRINGE (ML) INJECTION EVERY 6 HOURS
Refills: 0 | Status: DISCONTINUED | OUTPATIENT
Start: 2024-03-15 | End: 2024-03-15

## 2024-03-15 RX ORDER — KETOROLAC TROMETHAMINE 30 MG/ML
30 SYRINGE (ML) INJECTION EVERY 6 HOURS
Refills: 0 | Status: DISCONTINUED | OUTPATIENT
Start: 2024-03-15 | End: 2024-03-18

## 2024-03-15 RX ORDER — HYDROMORPHONE HYDROCHLORIDE 2 MG/ML
2 INJECTION INTRAMUSCULAR; INTRAVENOUS; SUBCUTANEOUS
Refills: 0 | Status: DISCONTINUED | OUTPATIENT
Start: 2024-03-15 | End: 2024-03-18

## 2024-03-15 RX ORDER — HYDROMORPHONE HYDROCHLORIDE 2 MG/ML
1 INJECTION INTRAMUSCULAR; INTRAVENOUS; SUBCUTANEOUS
Refills: 0 | Status: DISCONTINUED | OUTPATIENT
Start: 2024-03-15 | End: 2024-03-18

## 2024-03-15 RX ADMIN — Medication 3 MILLILITER(S): at 20:54

## 2024-03-15 RX ADMIN — HYDROMORPHONE HYDROCHLORIDE 30 MILLILITER(S): 2 INJECTION INTRAMUSCULAR; INTRAVENOUS; SUBCUTANEOUS at 07:30

## 2024-03-15 RX ADMIN — Medication 400 MILLIGRAM(S): at 00:49

## 2024-03-15 RX ADMIN — HYDROMORPHONE HYDROCHLORIDE 2 MILLIGRAM(S): 2 INJECTION INTRAMUSCULAR; INTRAVENOUS; SUBCUTANEOUS at 19:59

## 2024-03-15 RX ADMIN — Medication 3 MILLILITER(S): at 10:43

## 2024-03-15 RX ADMIN — Medication 400 MILLIGRAM(S): at 06:21

## 2024-03-15 RX ADMIN — HEPARIN SODIUM 5000 UNIT(S): 5000 INJECTION INTRAVENOUS; SUBCUTANEOUS at 06:21

## 2024-03-15 RX ADMIN — SENNA PLUS 2 TABLET(S): 8.6 TABLET ORAL at 21:26

## 2024-03-15 RX ADMIN — HYDROMORPHONE HYDROCHLORIDE 2 MILLIGRAM(S): 2 INJECTION INTRAMUSCULAR; INTRAVENOUS; SUBCUTANEOUS at 16:25

## 2024-03-15 RX ADMIN — HYDROMORPHONE HYDROCHLORIDE 2 MILLIGRAM(S): 2 INJECTION INTRAMUSCULAR; INTRAVENOUS; SUBCUTANEOUS at 20:59

## 2024-03-15 RX ADMIN — Medication 30 MILLIGRAM(S): at 18:10

## 2024-03-15 RX ADMIN — Medication 1000 MILLIGRAM(S): at 01:20

## 2024-03-15 RX ADMIN — Medication 400 MILLIGRAM(S): at 12:47

## 2024-03-15 RX ADMIN — HYDROMORPHONE HYDROCHLORIDE 2 MILLIGRAM(S): 2 INJECTION INTRAMUSCULAR; INTRAVENOUS; SUBCUTANEOUS at 14:25

## 2024-03-15 RX ADMIN — Medication 30 MILLIGRAM(S): at 09:44

## 2024-03-15 RX ADMIN — ONDANSETRON 4 MILLIGRAM(S): 8 TABLET, FILM COATED ORAL at 19:59

## 2024-03-15 RX ADMIN — HEPARIN SODIUM 5000 UNIT(S): 5000 INJECTION INTRAVENOUS; SUBCUTANEOUS at 21:26

## 2024-03-15 RX ADMIN — HEPARIN SODIUM 5000 UNIT(S): 5000 INJECTION INTRAVENOUS; SUBCUTANEOUS at 14:26

## 2024-03-15 NOTE — PHYSICAL THERAPY INITIAL EVALUATION ADULT - DID THE PATIENT HAVE SURGERY?
Drainage of hemothorax using video-assisted thoracoscopic surgery (VATS); Partial decortication of lung; Robotic assisted procedure, thoracoscopic/yes

## 2024-03-15 NOTE — PHYSICAL THERAPY INITIAL EVALUATION ADULT - GENERAL OBSERVATIONS, REHAB EVAL
Patient found sitting in chair at bedside, NAD, A&Ox4 +tele monitor, +chest tube, +IV, patient OK for PT per RN Belén, patient agreeable to participate in PT evaluation

## 2024-03-15 NOTE — PHYSICAL THERAPY INITIAL EVALUATION ADULT - NSPTDISCHREC_GEN_A_CORE
home with outpatient services to work on strength and cardiovascular endurance impairments once cleared by MD/Outpatient PT

## 2024-03-15 NOTE — PHYSICAL THERAPY INITIAL EVALUATION ADULT - ADDITIONAL COMMENTS
Patient lives in a private home with hr cousin with 5 steps to enter with bilateral handrails. Ambulatory without any devices, denies any falls.     Patient left sitting in chair in NAD, all lines and tubes intact, call bell within reach, spO2 94% following gait, RN Belén aware of pain
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
## 2024-03-15 NOTE — PHYSICAL THERAPY INITIAL EVALUATION ADULT - BALANCE DISTURBANCE, IDENTIFIED IMPAIRMENT CONTRIBUTE, REHAB EVAL
decreased strength [Fatigue] : fatigue [Back Pain] : back pain [Negative] : Psychiatric [Fever] : no fever [Night Sweats] : no night sweats

## 2024-03-16 LAB
ANION GAP SERPL CALC-SCNC: 9 MMOL/L — SIGNIFICANT CHANGE UP (ref 7–14)
BUN SERPL-MCNC: 8 MG/DL — SIGNIFICANT CHANGE UP (ref 7–23)
CALCIUM SERPL-MCNC: 8.4 MG/DL — SIGNIFICANT CHANGE UP (ref 8.4–10.5)
CHLORIDE SERPL-SCNC: 103 MMOL/L — SIGNIFICANT CHANGE UP (ref 98–107)
CO2 SERPL-SCNC: 25 MMOL/L — SIGNIFICANT CHANGE UP (ref 22–31)
CREAT SERPL-MCNC: 0.61 MG/DL — SIGNIFICANT CHANGE UP (ref 0.5–1.3)
EGFR: 126 ML/MIN/1.73M2 — SIGNIFICANT CHANGE UP
GLUCOSE SERPL-MCNC: 96 MG/DL — SIGNIFICANT CHANGE UP (ref 70–99)
HCT VFR BLD CALC: 33.3 % — LOW (ref 34.5–45)
HGB BLD-MCNC: 10.8 G/DL — LOW (ref 11.5–15.5)
MCHC RBC-ENTMCNC: 29 PG — SIGNIFICANT CHANGE UP (ref 27–34)
MCHC RBC-ENTMCNC: 32.4 GM/DL — SIGNIFICANT CHANGE UP (ref 32–36)
MCV RBC AUTO: 89.5 FL — SIGNIFICANT CHANGE UP (ref 80–100)
NRBC # BLD: 0 /100 WBCS — SIGNIFICANT CHANGE UP (ref 0–0)
NRBC # FLD: 0 K/UL — SIGNIFICANT CHANGE UP (ref 0–0)
PLATELET # BLD AUTO: 321 K/UL — SIGNIFICANT CHANGE UP (ref 150–400)
POTASSIUM SERPL-MCNC: 3.9 MMOL/L — SIGNIFICANT CHANGE UP (ref 3.5–5.3)
POTASSIUM SERPL-SCNC: 3.9 MMOL/L — SIGNIFICANT CHANGE UP (ref 3.5–5.3)
RBC # BLD: 3.72 M/UL — LOW (ref 3.8–5.2)
RBC # FLD: 13.7 % — SIGNIFICANT CHANGE UP (ref 10.3–14.5)
SODIUM SERPL-SCNC: 137 MMOL/L — SIGNIFICANT CHANGE UP (ref 135–145)
WBC # BLD: 9.04 K/UL — SIGNIFICANT CHANGE UP (ref 3.8–10.5)
WBC # FLD AUTO: 9.04 K/UL — SIGNIFICANT CHANGE UP (ref 3.8–10.5)

## 2024-03-16 PROCEDURE — 71045 X-RAY EXAM CHEST 1 VIEW: CPT | Mod: 26,76

## 2024-03-16 RX ORDER — ACETAMINOPHEN 500 MG
1000 TABLET ORAL ONCE
Refills: 0 | Status: COMPLETED | OUTPATIENT
Start: 2024-03-16 | End: 2024-03-16

## 2024-03-16 RX ADMIN — Medication 3 MILLILITER(S): at 17:17

## 2024-03-16 RX ADMIN — Medication 30 MILLIGRAM(S): at 05:21

## 2024-03-16 RX ADMIN — Medication 30 MILLIGRAM(S): at 01:23

## 2024-03-16 RX ADMIN — Medication 30 MILLIGRAM(S): at 06:21

## 2024-03-16 RX ADMIN — HYDROMORPHONE HYDROCHLORIDE 2 MILLIGRAM(S): 2 INJECTION INTRAMUSCULAR; INTRAVENOUS; SUBCUTANEOUS at 09:12

## 2024-03-16 RX ADMIN — HEPARIN SODIUM 5000 UNIT(S): 5000 INJECTION INTRAVENOUS; SUBCUTANEOUS at 05:21

## 2024-03-16 RX ADMIN — HYDROMORPHONE HYDROCHLORIDE 2 MILLIGRAM(S): 2 INJECTION INTRAMUSCULAR; INTRAVENOUS; SUBCUTANEOUS at 01:23

## 2024-03-16 RX ADMIN — HYDROMORPHONE HYDROCHLORIDE 2 MILLIGRAM(S): 2 INJECTION INTRAMUSCULAR; INTRAVENOUS; SUBCUTANEOUS at 13:10

## 2024-03-16 RX ADMIN — HEPARIN SODIUM 5000 UNIT(S): 5000 INJECTION INTRAVENOUS; SUBCUTANEOUS at 13:11

## 2024-03-16 RX ADMIN — HYDROMORPHONE HYDROCHLORIDE 2 MILLIGRAM(S): 2 INJECTION INTRAMUSCULAR; INTRAVENOUS; SUBCUTANEOUS at 00:23

## 2024-03-16 RX ADMIN — Medication 400 MILLIGRAM(S): at 16:39

## 2024-03-16 RX ADMIN — Medication 30 MILLIGRAM(S): at 00:23

## 2024-03-16 RX ADMIN — HYDROMORPHONE HYDROCHLORIDE 2 MILLIGRAM(S): 2 INJECTION INTRAMUSCULAR; INTRAVENOUS; SUBCUTANEOUS at 19:37

## 2024-03-16 RX ADMIN — Medication 30 MILLIGRAM(S): at 19:38

## 2024-03-16 RX ADMIN — Medication 3 MILLILITER(S): at 03:50

## 2024-03-16 RX ADMIN — Medication 3 MILLILITER(S): at 21:19

## 2024-03-16 RX ADMIN — Medication 3 MILLILITER(S): at 09:22

## 2024-03-16 RX ADMIN — Medication 30 MILLIGRAM(S): at 13:10

## 2024-03-16 RX ADMIN — POLYETHYLENE GLYCOL 3350 17 GRAM(S): 17 POWDER, FOR SOLUTION ORAL at 13:10

## 2024-03-16 RX ADMIN — HEPARIN SODIUM 5000 UNIT(S): 5000 INJECTION INTRAVENOUS; SUBCUTANEOUS at 22:11

## 2024-03-17 ENCOUNTER — TRANSCRIPTION ENCOUNTER (OUTPATIENT)
Age: 29
End: 2024-03-17

## 2024-03-17 LAB
ADD ON TEST-SPECIMEN IN LAB: SIGNIFICANT CHANGE UP
MAGNESIUM SERPL-MCNC: 2.1 MG/DL — SIGNIFICANT CHANGE UP (ref 1.6–2.6)
PHOSPHATE SERPL-MCNC: 3.5 MG/DL — SIGNIFICANT CHANGE UP (ref 2.5–4.5)

## 2024-03-17 PROCEDURE — 71045 X-RAY EXAM CHEST 1 VIEW: CPT | Mod: 26

## 2024-03-17 RX ADMIN — HYDROMORPHONE HYDROCHLORIDE 2 MILLIGRAM(S): 2 INJECTION INTRAMUSCULAR; INTRAVENOUS; SUBCUTANEOUS at 10:19

## 2024-03-17 RX ADMIN — Medication 30 MILLIGRAM(S): at 12:24

## 2024-03-17 RX ADMIN — Medication 3 MILLILITER(S): at 04:16

## 2024-03-17 RX ADMIN — Medication 30 MILLIGRAM(S): at 18:06

## 2024-03-17 RX ADMIN — Medication 3 MILLILITER(S): at 20:43

## 2024-03-17 RX ADMIN — HEPARIN SODIUM 5000 UNIT(S): 5000 INJECTION INTRAVENOUS; SUBCUTANEOUS at 05:49

## 2024-03-17 RX ADMIN — Medication 30 MILLIGRAM(S): at 00:14

## 2024-03-17 RX ADMIN — Medication 30 MILLIGRAM(S): at 00:45

## 2024-03-17 RX ADMIN — Medication 30 MILLIGRAM(S): at 05:49

## 2024-03-17 RX ADMIN — HYDROMORPHONE HYDROCHLORIDE 2 MILLIGRAM(S): 2 INJECTION INTRAMUSCULAR; INTRAVENOUS; SUBCUTANEOUS at 09:49

## 2024-03-17 NOTE — DISCHARGE NOTE PROVIDER - NSDCMRMEDTOKEN_GEN_ALL_CORE_FT
albuterol 0.63 mg/3 mL (0.021%) inhalation solution:  inhaled , As Needed - for shortness of breath and/or wheezing  Nasonex 50 mcg/inh nasal spray:  nasal , As Needed - for shortness of breath and/or wheezing   albuterol 0.63 mg/3 mL (0.021%) inhalation solution: inhaled every 6 hours as needed for - for shortness of breath and/or wheezing  benzonatate 100 mg oral capsule: 1 cap(s) orally every 8 hours as needed for Cough  HYDROmorphone 4 mg oral tablet: 0.5 tab(s) orally every 4 hours as needed for  severe pain MDD: 3 tabs  Narcan 4 mg/0.1 mL nasal spray: 4 milligram(s) intranasally once as needed for opioid over dose  Nasonex 50 mcg/inh nasal spray: nasal 2 times a day as needed for - for shortness of breath and/or wheezing  polyethylene glycol 3350 oral powder for reconstitution: 17 gram(s) orally once a day  senna leaf extract oral tablet: 2 tab(s) orally once a day (at bedtime)  Tylenol 325 mg oral tablet: 3 tab(s) orally every 6 hours as needed for mild to moderate pain Can alternate with Advil as well as needed.

## 2024-03-17 NOTE — DISCHARGE NOTE PROVIDER - NSDCACTIVITY_GEN_ALL_CORE
Return to Work/School allowed/Bathing allowed/Do not drive or operate machinery/Showering allowed/Do not make important decisions/Stairs allowed/Walking - Indoors allowed/No heavy lifting/straining/Walking - Outdoors allowed Bathing allowed/Do not drive or operate machinery/Showering allowed/Do not make important decisions/Stairs allowed/Walking - Indoors allowed/No heavy lifting/straining/Walking - Outdoors allowed
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
## 2024-03-17 NOTE — DISCHARGE NOTE PROVIDER - NSDCCPTREATMENT_GEN_ALL_CORE_FT
PRINCIPAL PROCEDURE  Procedure: Drainage of hemothorax using video-assisted thoracoscopic surgery (VATS)  Findings and Treatment:       SECONDARY PROCEDURE  Procedure: Partial decortication of lung  Findings and Treatment:

## 2024-03-17 NOTE — DISCHARGE NOTE PROVIDER - NSDCFUADDINST_GEN_ALL_CORE_FT
Keep the chest tube site clean with soap and water.  Allow it to dry and leave it open to air as much as possible.  Some drainage is normal but watch for pus, increasing redness, fevers, difficulty breathing or other unusual symptoms and if noted, call Dr Hicks.  The suture will come out at the office. Keep the chest tube site clean with soap and water.  Can shower daily. Allow it to dry and leave it open to air as much as possible.  Some drainage is normal but watch for pus, increasing redness, fevers, difficulty breathing or other unusual symptoms and if noted, call Dr Hicks. Leave wounds uncovered. Keep left arm covered and dry when showering.     The suture will come out at the office.  Walk 4-5 x per day. Increase as tolerated. You may climb stairs. Continue to use incentive spirometer.
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
## 2024-03-17 NOTE — DISCHARGE NOTE PROVIDER - NSDCFUADDAPPT_GEN_ALL_CORE_FT
Call Dr Bojorquez to follow up with your hand  Se your PCP as soon as possible Call Dr Bojorquez to follow up with your hand as directed  Consider seeing pain medicine specialist if ongoing pain at injury sites after next week.   See Dr. Hicks in 2 weeks in office. Call to make your apt. [phone]  Have a chest xray done prior to that apt.   See your PCP as needed

## 2024-03-18 ENCOUNTER — RESULT REVIEW (OUTPATIENT)
Age: 29
End: 2024-03-18

## 2024-03-18 ENCOUNTER — TRANSCRIPTION ENCOUNTER (OUTPATIENT)
Age: 29
End: 2024-03-18

## 2024-03-18 VITALS — OXYGEN SATURATION: 99 %

## 2024-03-18 LAB
ANION GAP SERPL CALC-SCNC: 10 MMOL/L — SIGNIFICANT CHANGE UP (ref 7–14)
BUN SERPL-MCNC: 12 MG/DL — SIGNIFICANT CHANGE UP (ref 7–23)
CALCIUM SERPL-MCNC: 8.7 MG/DL — SIGNIFICANT CHANGE UP (ref 8.4–10.5)
CHLORIDE SERPL-SCNC: 105 MMOL/L — SIGNIFICANT CHANGE UP (ref 98–107)
CO2 SERPL-SCNC: 25 MMOL/L — SIGNIFICANT CHANGE UP (ref 22–31)
CREAT SERPL-MCNC: 0.61 MG/DL — SIGNIFICANT CHANGE UP (ref 0.5–1.3)
EGFR: 126 ML/MIN/1.73M2 — SIGNIFICANT CHANGE UP
GLUCOSE SERPL-MCNC: 106 MG/DL — HIGH (ref 70–99)
HCT VFR BLD CALC: 31.6 % — LOW (ref 34.5–45)
HGB BLD-MCNC: 10.4 G/DL — LOW (ref 11.5–15.5)
MAGNESIUM SERPL-MCNC: 1.9 MG/DL — SIGNIFICANT CHANGE UP (ref 1.6–2.6)
MCHC RBC-ENTMCNC: 29.1 PG — SIGNIFICANT CHANGE UP (ref 27–34)
MCHC RBC-ENTMCNC: 32.9 GM/DL — SIGNIFICANT CHANGE UP (ref 32–36)
MCV RBC AUTO: 88.5 FL — SIGNIFICANT CHANGE UP (ref 80–100)
NRBC # BLD: 0 /100 WBCS — SIGNIFICANT CHANGE UP (ref 0–0)
NRBC # FLD: 0 K/UL — SIGNIFICANT CHANGE UP (ref 0–0)
PHOSPHATE SERPL-MCNC: 4.1 MG/DL — SIGNIFICANT CHANGE UP (ref 2.5–4.5)
PLATELET # BLD AUTO: 313 K/UL — SIGNIFICANT CHANGE UP (ref 150–400)
POTASSIUM SERPL-MCNC: 3.8 MMOL/L — SIGNIFICANT CHANGE UP (ref 3.5–5.3)
POTASSIUM SERPL-SCNC: 3.8 MMOL/L — SIGNIFICANT CHANGE UP (ref 3.5–5.3)
RBC # BLD: 3.57 M/UL — LOW (ref 3.8–5.2)
RBC # FLD: 13.2 % — SIGNIFICANT CHANGE UP (ref 10.3–14.5)
SODIUM SERPL-SCNC: 140 MMOL/L — SIGNIFICANT CHANGE UP (ref 135–145)
WBC # BLD: 7.75 K/UL — SIGNIFICANT CHANGE UP (ref 3.8–10.5)
WBC # FLD AUTO: 7.75 K/UL — SIGNIFICANT CHANGE UP (ref 3.8–10.5)

## 2024-03-18 PROCEDURE — 93306 TTE W/DOPPLER COMPLETE: CPT | Mod: 26

## 2024-03-18 PROCEDURE — 76376 3D RENDER W/INTRP POSTPROCES: CPT | Mod: 26

## 2024-03-18 PROCEDURE — 71045 X-RAY EXAM CHEST 1 VIEW: CPT | Mod: 26

## 2024-03-18 RX ORDER — POLYETHYLENE GLYCOL 3350 17 G/17G
17 POWDER, FOR SOLUTION ORAL
Qty: 0 | Refills: 0 | DISCHARGE
Start: 2024-03-18

## 2024-03-18 RX ORDER — ALBUTEROL 90 UG/1
3 AEROSOL, METERED ORAL
Qty: 168 | Refills: 0
Start: 2024-03-18 | End: 2024-03-31

## 2024-03-18 RX ORDER — NALOXONE HYDROCHLORIDE 4 MG/.1ML
4 SPRAY NASAL
Qty: 1 | Refills: 0
Start: 2024-03-18

## 2024-03-18 RX ORDER — HYDROMORPHONE HYDROCHLORIDE 2 MG/ML
0.5 INJECTION INTRAMUSCULAR; INTRAVENOUS; SUBCUTANEOUS
Qty: 21 | Refills: 0
Start: 2024-03-18 | End: 2024-03-24

## 2024-03-18 RX ORDER — SENNA PLUS 8.6 MG/1
2 TABLET ORAL
Qty: 0 | Refills: 0 | DISCHARGE
Start: 2024-03-18

## 2024-03-18 RX ADMIN — HYDROMORPHONE HYDROCHLORIDE 1 MILLIGRAM(S): 2 INJECTION INTRAMUSCULAR; INTRAVENOUS; SUBCUTANEOUS at 00:20

## 2024-03-18 RX ADMIN — Medication 30 MILLIGRAM(S): at 00:20

## 2024-03-18 RX ADMIN — Medication 3 MILLILITER(S): at 15:20

## 2024-03-18 RX ADMIN — DORNASE ALFA 2.5 MILLIGRAM(S): 1 SOLUTION RESPIRATORY (INHALATION) at 10:04

## 2024-03-18 RX ADMIN — Medication 3 MILLILITER(S): at 10:04

## 2024-03-18 RX ADMIN — Medication 3 MILLILITER(S): at 04:43

## 2024-03-18 RX ADMIN — HYDROMORPHONE HYDROCHLORIDE 2 MILLIGRAM(S): 2 INJECTION INTRAMUSCULAR; INTRAVENOUS; SUBCUTANEOUS at 13:52

## 2024-03-18 RX ADMIN — Medication 30 MILLIGRAM(S): at 06:13

## 2024-03-18 RX ADMIN — HYDROMORPHONE HYDROCHLORIDE 1 MILLIGRAM(S): 2 INJECTION INTRAMUSCULAR; INTRAVENOUS; SUBCUTANEOUS at 00:50

## 2024-03-18 NOTE — DISCHARGE NOTE NURSING/CASE MANAGEMENT/SOCIAL WORK - NSTOBACCONEVERSMOKERY/N_GEN_A
Denny Nunn, VINEET  Podiatry  [address], [place] [address]  [phone]  Please call to make an appointment for next week    [facility]  [address], [place] [address]  Phone: [phone]
Yes

## 2024-03-19 LAB
CULTURE RESULTS: SIGNIFICANT CHANGE UP
SPECIMEN SOURCE: SIGNIFICANT CHANGE UP

## 2024-03-22 NOTE — CHART NOTE - NSCHARTNOTEFT_GEN_A_CORE
Post-Discharge Medication Review    Patient's preferred pharmacy was updated in OMR: [facility]    Patient contacted to offer medication counseling post-discharge. Medication reconciliation completed. Per patient, medications include:  1.	albuterol 0.63 mg/3 mL (0.021%) inhalation solution inhaled every 6 hours as needed for - for shortness of breath and/or wheezing  2.	benzonatate 100 mg oral capsule 1 cap(s) orally every 8 hours as needed for Cough  3.	HYDROmorphone 4 mg oral tablet 0.5 tab(s) orally every 4 hours as needed for severe pain MDD: 3 tabs  4.	Narcan 4 mg/0.1 mL nasal spray 4 milligram(s) intranasally once as needed for opioid over dose  5.	Nasonex 50 mcg/inh nasal spray nasal 2 times a day as needed for - for shortness of breath and/or wheezing  6.	polyethylene glycol 3350 oral powder for reconstitution 17 gram(s) orally once a day  7.	senna leaf extract oral tablet 2 tab(s) orally once a day (at bedtime)  8.	Tylenol 325 mg oral tablet 3 tab(s) orally every 6 hours as needed for mild to moderate pain Can alternate with Advil as well as needed.  Medications to be added to OMR (updated per discussion with patient):  9.	Ventolin inhaler 2 puff po Q4-6H    Medication name, indication, administration, side effect, and monitoring reviewed for new medications during post discharge counseling visit with patient. Patient demonstrated understanding. Counseling offered for all medications.    Patient was given albuterol 0.63 mg/3 mL (0.021%)solution via nebulizer at discharge, however, patient stated that she does not have a nebulizer machine at home and is unable to use the medication. Patient stated that she has Ventolin inhaler that she was uses occasionally and prefers the albuterol via nebulizer instead. Reached out to care team to coordinate a new prescription for a nebulizer machine be sent to [facility] pharmacy per patient request. Patient confirmed that she understands how use a nebulizer and will use at home in place of her Ventolin inhaler. Post-Discharge Medication Review    Patient's preferred pharmacy was updated in OMR: [facility]    Patient contacted to offer medication counseling post-discharge. Medication reconciliation completed. Per patient, medications include:  1.	albuterol 0.63 mg/3 mL (0.021%) inhalation solution inhaled every 6 hours as needed for - for shortness of breath and/or wheezing  2.	benzonatate 100 mg oral capsule 1 cap(s) orally every 8 hours as needed for Cough  3.	HYDROmorphone 4 mg oral tablet 0.5 tab(s) orally every 4 hours as needed for severe pain MDD: 3 tabs  4.	Narcan 4 mg/0.1 mL nasal spray 4 milligram(s) intranasally once as needed for opioid over dose  5.	Nasonex 50 mcg/inh nasal spray nasal 2 times a day as needed for - for shortness of breath and/or wheezing  6.	polyethylene glycol 3350 oral powder for reconstitution 17 gram(s) orally once a day  7.	senna leaf extract oral tablet 2 tab(s) orally once a day (at bedtime)  8.	Tylenol 325 mg oral tablet 3 tab(s) orally every 6 hours as needed for mild to moderate pain Can alternate with Advil as well as needed.  Medications to be added to OMR (updated per discussion with patient):  9.	Ventolin inhaler 2 puff po Q4-6H    Medication name, indication, administration, side effect, and monitoring reviewed for new medications during post discharge counseling visit with patient. Patient demonstrated understanding. Counseling offered for all medications.    Patient was given albuterol 0.63 mg/3 mL (0.021%)solution via nebulizer at discharge, however, patient stated that she does not have a nebulizer machine at home and is unable to use the medication. Patient stated that she has Ventolin inhaler that she uses occasionally but,  prefers the albuterol via nebulizer instead. Reached out to care team to coordinate a new prescription for a nebulizer machine be sent to [facility] pharmacy per patient request. Patient confirmed that she understands how use a nebulizer and will use at home in place of her Ventolin inhaler. Post-Discharge Medication Review    Patient's preferred pharmacy was updated in OMR: [facility]    Patient contacted to offer medication counseling post-discharge. Medication reconciliation completed. Per patient, medications include:  1.	albuterol 0.63 mg/3 mL (0.021%) inhalation solution inhaled every 6 hours as needed for - for shortness of breath and/or wheezing  2.	benzonatate 100 mg oral capsule 1 cap(s) orally every 8 hours as needed for Cough  3.	HYDROmorphone 4 mg oral tablet 0.5 tab(s) orally every 4 hours as needed for severe pain MDD: 3 tabs  4.	Narcan 4 mg/0.1 mL nasal spray 4 milligram(s) intranasally once as needed for opioid over dose  5.	Nasonex 50 mcg/inh nasal spray nasal 2 times a day as needed for - for shortness of breath and/or wheezing  6.	polyethylene glycol 3350 oral powder for reconstitution 17 gram(s) orally once a day  7.	senna leaf extract oral tablet 2 tab(s) orally once a day (at bedtime)  8.	Tylenol 325 mg oral tablet 3 tab(s) orally every 6 hours as needed for mild to moderate pain Can alternate with Advil as well as needed.  Medications to be added to OMR (updated per discussion with patient):  9.	Ventolin inhaler 2 puff po Q4-6H    Medication name, indication, administration, side effect, and monitoring reviewed for new medications during post discharge counseling visit with patient. Patient demonstrated understanding. Counseling offered for all medications.    Patient was given albuterol 0.63 mg/3 mL (0.021%)solution via nebulizer at discharge, however, patient stated that she does not have a nebulizer machine at home and is unable to use the medication. Patient stated that she has Ventolin inhaler that she uses occasionally but,  prefers the albuterol via nebulizer instead. Reached out to care team to coordinate a new prescription for a nebulizer machine be sent to [facility] pharmacy per patient request. Patient confirmed that she understands how use a nebulizer and will use at home in place of her Ventolin inhaler.    -Attempted to reach patient after provider confirmed that they will send a new prescription for a nebulizer machine to [facility].   Per [facility] pharmacy note 3/26 [id number] "patient does not want" Post-Discharge Medication Review    Patient's preferred pharmacy was updated in OMR: [facility]    Patient contacted to offer medication counseling post-discharge. Medication reconciliation completed. Per patient, medications include:  1.albuterol 0.63 mg/3 mL (0.021%) inhalation solution inhaled every 6 hours as needed for - for shortness of breath and/or wheezing  2. benzonatate 100 mg oral capsule 1 cap(s) orally every 8 hours as needed for Cough  3. HYDROmorphone 4 mg oral tablet 0.5 tab(s) orally every 4 hours as needed for severe pain MDD: 3 tabs  4. Narcan 4 mg/0.1 mL nasal spray 4 milligram(s) intranasally once as needed for opioid over dose  5. Nasonex 50 mcg/inh nasal spray nasal 2 times a day as needed for - for shortness of breath and/or wheezing  6. polyethylene glycol 3350 oral powder for reconstitution 17 gram(s) orally once a day  7. senna leaf extract oral tablet 2 tab(s) orally once a day (at bedtime)  8. Tylenol 325 mg oral tablet 3 tab(s) orally every 6 hours as needed for mild to moderate pain Can alternate with Advil as well as needed.  Medications to be added to OMR (updated per discussion with patient):  9. Ventolin inhaler 2 puff po Q4-6H    Medication name, indication, administration, side effect, and monitoring reviewed for new medications during post discharge counseling visit with patient. Patient demonstrated understanding. Counseling offered for all medications.    Patient was given albuterol 0.63 mg/3 mL (0.021%)solution via nebulizer at discharge, however, patient stated that she does not have a nebulizer machine at home and is unable to use the medication. Patient stated that she has Ventolin inhaler that she uses occasionally but,  prefers the albuterol via nebulizer instead. Reached out to care team to coordinate a new prescription for a nebulizer machine be sent to [facility] pharmacy per patient request. Patient confirmed that she understands how use a nebulizer and will use at home in place of her Ventolin inhaler.    -Attempted to reach patient after provider confirmed that they will send a new prescription for a nebulizer machine to [facility].   Per [facility] pharmacy note 3/26 [id number] "patient does not want"

## 2024-03-25 RX ORDER — ACETAMINOPHEN 500 MG
3 TABLET ORAL
Qty: 0 | Refills: 0 | DISCHARGE

## 2024-03-26 NOTE — ED PROVIDER NOTE - WR ORDER DATE AND TIME 1
- BAL 2/21: PSA; Txd with Zosyn ( 2/21-2/28)  - Sputum 3/4: PSA , Blood cxs 3/3: NGTD  - Txd with course of Meropenem 3/3 --> 3/11  - 3/20 tmax 100.7 - pancultured, chest xray clear, UA negative, Blood cultures -NGTD,  Sputum- NGTD  - 3/21 Monitoring off antibiotics 25-Feb-2024 00:51

## 2024-03-29 RX ORDER — ALBUTEROL 90 UG/1
2 AEROSOL, METERED ORAL
Refills: 0 | DISCHARGE

## 2024-04-10 PROBLEM — J94.2 HEMOTHORAX ON RIGHT: Status: ACTIVE | Noted: 2024-04-10

## 2024-04-13 LAB
CULTURE RESULTS: SIGNIFICANT CHANGE UP
SPECIMEN SOURCE: SIGNIFICANT CHANGE UP

## 2024-04-15 ENCOUNTER — APPOINTMENT (OUTPATIENT)
Dept: THORACIC SURGERY | Facility: CLINIC | Age: 29
End: 2024-04-15
Payer: MEDICAID

## 2024-04-15 ENCOUNTER — OUTPATIENT (OUTPATIENT)
Dept: OUTPATIENT SERVICES | Facility: HOSPITAL | Age: 29
LOS: 1 days | End: 2024-04-15
Payer: MEDICAID

## 2024-04-15 ENCOUNTER — APPOINTMENT (OUTPATIENT)
Dept: RADIOLOGY | Facility: HOSPITAL | Age: 29
End: 2024-04-15

## 2024-04-15 VITALS
DIASTOLIC BLOOD PRESSURE: 71 MMHG | OXYGEN SATURATION: 98 % | WEIGHT: 180 LBS | SYSTOLIC BLOOD PRESSURE: 105 MMHG | RESPIRATION RATE: 17 BRPM | HEART RATE: 83 BPM | HEIGHT: 64 IN | BODY MASS INDEX: 30.73 KG/M2

## 2024-04-15 DIAGNOSIS — J94.2 HEMOTHORAX: ICD-10-CM

## 2024-04-15 PROCEDURE — 71046 X-RAY EXAM CHEST 2 VIEWS: CPT | Mod: 26

## 2024-04-15 PROCEDURE — 99024 POSTOP FOLLOW-UP VISIT: CPT

## 2024-04-15 RX ORDER — ASPIRIN 81 MG
81 TABLET, DELAYED RELEASE (ENTERIC COATED) ORAL
Refills: 0 | Status: ACTIVE | COMMUNITY

## 2024-04-15 RX ORDER — HYDROMORPHONE HYDROCHLORIDE 1 MG/ML
1 INJECTION, SOLUTION INTRAMUSCULAR; INTRAVENOUS; SUBCUTANEOUS
Refills: 0 | Status: ACTIVE | COMMUNITY
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
## 2024-05-01 LAB
CULTURE RESULTS: SIGNIFICANT CHANGE UP
SPECIMEN SOURCE: SIGNIFICANT CHANGE UP

## 2024-07-19 ENCOUNTER — EMERGENCY (EMERGENCY)
Facility: HOSPITAL | Age: 29
LOS: 1 days | Discharge: HOME | End: 2024-07-19
Admitting: EMERGENCY MEDICINE
Payer: MEDICAID

## 2024-07-19 VITALS
WEIGHT: 175.93 LBS | RESPIRATION RATE: 18 BRPM | TEMPERATURE: 98 F | DIASTOLIC BLOOD PRESSURE: 70 MMHG | SYSTOLIC BLOOD PRESSURE: 109 MMHG | HEIGHT: 64 IN | HEART RATE: 73 BPM | OXYGEN SATURATION: 100 %

## 2024-07-19 LAB
ALBUMIN SERPL ELPH-MCNC: 4.2 G/DL — SIGNIFICANT CHANGE UP (ref 3.3–5)
ALP SERPL-CCNC: 84 U/L — SIGNIFICANT CHANGE UP (ref 40–120)
ALT FLD-CCNC: 32 U/L — SIGNIFICANT CHANGE UP (ref 4–33)
ANION GAP SERPL CALC-SCNC: 9 MMOL/L — SIGNIFICANT CHANGE UP (ref 7–14)
APPEARANCE UR: ABNORMAL
AST SERPL-CCNC: 19 U/L — SIGNIFICANT CHANGE UP (ref 4–32)
BASOPHILS # BLD AUTO: 0.03 K/UL — SIGNIFICANT CHANGE UP (ref 0–0.2)
BASOPHILS NFR BLD AUTO: 0.5 % — SIGNIFICANT CHANGE UP (ref 0–2)
BILIRUB SERPL-MCNC: 0.2 MG/DL — SIGNIFICANT CHANGE UP (ref 0.2–1.2)
BILIRUB UR-MCNC: NEGATIVE — SIGNIFICANT CHANGE UP
BUN SERPL-MCNC: 7 MG/DL — SIGNIFICANT CHANGE UP (ref 7–23)
CALCIUM SERPL-MCNC: 9.1 MG/DL — SIGNIFICANT CHANGE UP (ref 8.4–10.5)
CHLORIDE SERPL-SCNC: 105 MMOL/L — SIGNIFICANT CHANGE UP (ref 98–107)
CO2 SERPL-SCNC: 26 MMOL/L — SIGNIFICANT CHANGE UP (ref 22–31)
COLOR SPEC: SIGNIFICANT CHANGE UP
CREAT SERPL-MCNC: 0.7 MG/DL — SIGNIFICANT CHANGE UP (ref 0.5–1.3)
DIFF PNL FLD: NEGATIVE — SIGNIFICANT CHANGE UP
EGFR: 121 ML/MIN/1.73M2 — SIGNIFICANT CHANGE UP
EOSINOPHIL # BLD AUTO: 0.05 K/UL — SIGNIFICANT CHANGE UP (ref 0–0.5)
EOSINOPHIL NFR BLD AUTO: 0.8 % — SIGNIFICANT CHANGE UP (ref 0–6)
GLUCOSE SERPL-MCNC: 99 MG/DL — SIGNIFICANT CHANGE UP (ref 70–99)
GLUCOSE UR QL: NEGATIVE MG/DL — SIGNIFICANT CHANGE UP
HCT VFR BLD CALC: 42.1 % — SIGNIFICANT CHANGE UP (ref 34.5–45)
HGB BLD-MCNC: 13.7 G/DL — SIGNIFICANT CHANGE UP (ref 11.5–15.5)
IANC: 3.92 K/UL — SIGNIFICANT CHANGE UP (ref 1.8–7.4)
IMM GRANULOCYTES NFR BLD AUTO: 0.2 % — SIGNIFICANT CHANGE UP (ref 0–0.9)
KETONES UR-MCNC: ABNORMAL MG/DL
LEUKOCYTE ESTERASE UR-ACNC: NEGATIVE — SIGNIFICANT CHANGE UP
LIDOCAIN IGE QN: 18 U/L — SIGNIFICANT CHANGE UP (ref 7–60)
LYMPHOCYTES # BLD AUTO: 1.61 K/UL — SIGNIFICANT CHANGE UP (ref 1–3.3)
LYMPHOCYTES # BLD AUTO: 25.6 % — SIGNIFICANT CHANGE UP (ref 13–44)
MAGNESIUM SERPL-MCNC: 2.2 MG/DL — SIGNIFICANT CHANGE UP (ref 1.6–2.6)
MCHC RBC-ENTMCNC: 26.8 PG — LOW (ref 27–34)
MCHC RBC-ENTMCNC: 32.5 GM/DL — SIGNIFICANT CHANGE UP (ref 32–36)
MCV RBC AUTO: 82.4 FL — SIGNIFICANT CHANGE UP (ref 80–100)
MONOCYTES # BLD AUTO: 0.66 K/UL — SIGNIFICANT CHANGE UP (ref 0–0.9)
MONOCYTES NFR BLD AUTO: 10.5 % — SIGNIFICANT CHANGE UP (ref 2–14)
NEUTROPHILS # BLD AUTO: 3.92 K/UL — SIGNIFICANT CHANGE UP (ref 1.8–7.4)
NEUTROPHILS NFR BLD AUTO: 62.4 % — SIGNIFICANT CHANGE UP (ref 43–77)
NITRITE UR-MCNC: NEGATIVE — SIGNIFICANT CHANGE UP
NRBC # BLD: 0 /100 WBCS — SIGNIFICANT CHANGE UP (ref 0–0)
NRBC # FLD: 0 K/UL — SIGNIFICANT CHANGE UP (ref 0–0)
PH UR: 5.5 — SIGNIFICANT CHANGE UP (ref 5–8)
PHOSPHATE SERPL-MCNC: 2.9 MG/DL — SIGNIFICANT CHANGE UP (ref 2.5–4.5)
PLATELET # BLD AUTO: 179 K/UL — SIGNIFICANT CHANGE UP (ref 150–400)
POTASSIUM SERPL-MCNC: 4.2 MMOL/L — SIGNIFICANT CHANGE UP (ref 3.5–5.3)
POTASSIUM SERPL-SCNC: 4.2 MMOL/L — SIGNIFICANT CHANGE UP (ref 3.5–5.3)
PROT SERPL-MCNC: 7 G/DL — SIGNIFICANT CHANGE UP (ref 6–8.3)
PROT UR-MCNC: 30 MG/DL
RBC # BLD: 5.11 M/UL — SIGNIFICANT CHANGE UP (ref 3.8–5.2)
RBC # FLD: 15.3 % — HIGH (ref 10.3–14.5)
RBC CASTS # UR COMP ASSIST: 0 /HPF — SIGNIFICANT CHANGE UP (ref 0–4)
SODIUM SERPL-SCNC: 140 MMOL/L — SIGNIFICANT CHANGE UP (ref 135–145)
SP GR SPEC: 1.03 — HIGH (ref 1–1.03)
TROPONIN T, HIGH SENSITIVITY RESULT: <6 NG/L — SIGNIFICANT CHANGE UP
UROBILINOGEN FLD QL: 1 MG/DL — SIGNIFICANT CHANGE UP (ref 0.2–1)
WBC # BLD: 6.28 K/UL — SIGNIFICANT CHANGE UP (ref 3.8–10.5)
WBC # FLD AUTO: 6.28 K/UL — SIGNIFICANT CHANGE UP (ref 3.8–10.5)
WBC UR QL: 1 /HPF — SIGNIFICANT CHANGE UP (ref 0–5)

## 2024-07-19 PROCEDURE — 93010 ELECTROCARDIOGRAM REPORT: CPT

## 2024-07-19 PROCEDURE — 99285 EMERGENCY DEPT VISIT HI MDM: CPT

## 2024-07-19 PROCEDURE — 71101 X-RAY EXAM UNILAT RIBS/CHEST: CPT | Mod: 26,RT

## 2024-07-19 PROCEDURE — 76705 ECHO EXAM OF ABDOMEN: CPT | Mod: 26

## 2024-07-19 RX ORDER — LIDOCAINE HCL 28 MG/G
1 GEL TOPICAL ONCE
Refills: 0 | Status: COMPLETED | OUTPATIENT
Start: 2024-07-19 | End: 2024-07-19

## 2024-07-19 RX ORDER — ACETAMINOPHEN 325 MG
1000 TABLET ORAL ONCE
Refills: 0 | Status: COMPLETED | OUTPATIENT
Start: 2024-07-19 | End: 2024-07-19

## 2024-07-19 RX ORDER — ACETAMINOPHEN 325 MG
2 TABLET ORAL
Qty: 80 | Refills: 0
Start: 2024-07-19 | End: 2024-07-28

## 2024-07-19 RX ORDER — LIDOCAINE HCL 28 MG/G
1 GEL TOPICAL
Qty: 7 | Refills: 0
Start: 2024-07-19 | End: 2024-07-25

## 2024-07-19 RX ADMIN — LIDOCAINE HCL 1 PATCH: 28 GEL TOPICAL at 11:34

## 2024-07-19 RX ADMIN — Medication 400 MILLIGRAM(S): at 11:34
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
## 2024-07-19 NOTE — ED PROVIDER NOTE - NSFOLLOWUPINSTRUCTIONS_ED_ALL_ED_FT
Advance Care Planning     General Advance Care Planning (ACP) Conversation    Date of Conversation: 3/17/2022  Conducted with: Patient with Decision Making Capacity    Healthcare Decision Maker:  Primary Decision Maker: Flynn Abbott (C) [phone]     Today we documented Decision Maker(s) consistent with Legal Next of Kin hierarchy. Content/Action Overview: Has ACP document(s) on file - reflects the patient's care preferences  Reviewed DNR/DNI and patient elects full code  . artificial nutrition, ventilation preferences, resuscitation preferences and end of life care preferences (vegetative state/imminent death)     Patient does not want to be on long term life support or in vegetative state. Patients daughter was present for discussion.      Length of Voluntary ACP Conversation in minutes:  <16 minutes (Non-Billable)    KAREN Gore Intern
Leta Gan, ACP
Chest Wall Pain  Chest wall pain is pain in or around the bones and muscles of your chest. Chest wall pain may be caused by:  An injury.  Coughing a lot.  Using your chest and arm muscles too much.  Sometimes, the cause may not be known. This pain may take a few weeks or longer to get better.    Follow these instructions at home:  Managing pain, stiffness, and swelling    A bag of ice on a towel on the skin  If told, put ice on the painful area:  Put ice in a plastic bag.  Place a towel between your skin and the bag.  Leave the ice on for 20 minutes, 2–3 times a day.  Activity    Rest as told by your doctor.  Avoid doing things that cause pain. This includes lifting heavy items.  Ask your doctor what activities are safe for you.  General instructions    A do not smoke cigarettes sign.  Take over-the-counter and prescription medicines only as told by your doctor.  Do not use any products that contain nicotine or tobacco, such as cigarettes, e-cigarettes, and chewing tobacco. If you need help quitting, ask your doctor.  Keep all follow-up visits as told by your doctor. This is important.  Contact a doctor if:  You have a fever.  Your chest pain gets worse.  You have new symptoms.  Get help right away if:  You feel sick to your stomach (nauseous) or you throw up (vomit).  You feel sweaty or light-headed.  You have a cough with mucus from your lungs (sputum) or you cough up blood.  You are short of breath.  These symptoms may be an emergency. Do not wait to see if the symptoms will go away. Get medical help right away. Call your local emergency services (911 in the [place] Do not drive yourself to the hospital.    Summary  Chest wall pain is pain in or around the bones and muscles of your chest.  It may be treated with ice, rest, and medicines. Your condition may also get better if you avoid doing things that cause pain.  Contact a doctor if you have a fever, chest pain that gets worse, or new symptoms.  Get help right away if you feel light-headed or you get short of breath. These symptoms may be an emergency.  This information is not intended to replace advice given to you by your health care provider. Make sure you discuss any questions you have with your health care provider.

## 2024-07-19 NOTE — ED PROVIDER NOTE - PROGRESS NOTE DETAILS
NEETA huffman: Pt states pain mildly improved. Discussed XR and labs unremarkable. Pt pending US results. NEETA Albright: Pt states feels better. Discussed US also unremarkable. Discussed possible MSK pain to continue with acetaminophen and warm compresses, if pain does not improve or become worse advised to return. Discussed other return precautions and prompt f/u. Pt states she has a PMD and will follow up this week. Pt verbalized an understanding and agrees with the plan. IV removed.

## 2024-07-19 NOTE — ED PROVIDER NOTE - RESPIRATORY, MLM
Breath sounds clear and equal bilaterally. ttp over the lower right ribs with no deformity No ttp over breast ( chaperone RN saurabh)
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
## 2024-07-19 NOTE — ED PROVIDER NOTE - PRIOR EKG STATUS
the EKG is unchanged from prior EKG Site: Sternum (24 May 2023 23:22)  Bilirubin: 8 (24 May 2023 23:22)

## 2024-07-19 NOTE — ED ADULT NURSE NOTE - OBJECTIVE STATEMENT
Patient A&o X4, received in fast track , with complaints of right rib pain. Patient states, "I have been having right sided rib pain for the past three days but I had rib fractures 3 months ago". Patient reports denies any recent falls or trauma to rib. Tenderness noted to RUQ, no bruising noted to rib area at this time. Patient able to speak in clear sentences, respirations equal and unlabored. Lung sounds clear b/l, equal chest rise and fall noted. Denies CP/SOB, fever, chills, nausea, vomiting and diarrhea at this time. Skin warm and dry. Provider at bedside for eval, pending further orders.
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
## 2024-07-19 NOTE — ED PROVIDER NOTE - PATIENT PORTAL LINK FT
You can access the FollowMyHealth Patient Portal offered by [facility] by registering at the following website: [email or web address]. By joining [facility]’s FollowMyHealth portal, you will also be able to view your health information using other applications (apps) compatible with our system.

## 2024-07-20 NOTE — DISCHARGE NOTE NURSING/CASE MANAGEMENT/SOCIAL WORK - PATIENT PORTAL LINK FT
You can access the FollowMyHealth Patient Portal offered by [facility] by registering at the following website: [email or web address]. By joining [facility]’s FollowMyHealth portal, you will also be able to view your health information using other applications (apps) compatible with our system.
Greg Ramos(Attending)

## 2024-10-14 ENCOUNTER — EMERGENCY (EMERGENCY)
Facility: HOSPITAL | Age: 29
LOS: 1 days | Discharge: HOME | End: 2024-10-14
Attending: STUDENT IN AN ORGANIZED HEALTH CARE EDUCATION/TRAINING PROGRAM | Admitting: STUDENT IN AN ORGANIZED HEALTH CARE EDUCATION/TRAINING PROGRAM
Payer: MEDICAID

## 2024-10-14 VITALS
HEART RATE: 63 BPM | DIASTOLIC BLOOD PRESSURE: 83 MMHG | HEIGHT: 64 IN | RESPIRATION RATE: 18 BRPM | OXYGEN SATURATION: 99 % | TEMPERATURE: 98 F | SYSTOLIC BLOOD PRESSURE: 137 MMHG | WEIGHT: 179.9 LBS

## 2024-10-14 VITALS
OXYGEN SATURATION: 100 % | HEART RATE: 63 BPM | SYSTOLIC BLOOD PRESSURE: 104 MMHG | DIASTOLIC BLOOD PRESSURE: 66 MMHG | RESPIRATION RATE: 18 BRPM | TEMPERATURE: 98 F

## 2024-10-14 PROCEDURE — 71046 X-RAY EXAM CHEST 2 VIEWS: CPT | Mod: 26

## 2024-10-14 PROCEDURE — 99284 EMERGENCY DEPT VISIT MOD MDM: CPT

## 2024-10-14 PROCEDURE — 93010 ELECTROCARDIOGRAM REPORT: CPT

## 2024-10-14 RX ORDER — METOCLOPRAMIDE HCL 5 MG
10 TABLET ORAL ONCE
Refills: 0 | Status: COMPLETED | OUTPATIENT
Start: 2024-10-14 | End: 2024-10-14

## 2024-10-14 RX ORDER — ACETAMINOPHEN 325 MG
1000 TABLET ORAL ONCE
Refills: 0 | Status: COMPLETED | OUTPATIENT
Start: 2024-10-14 | End: 2024-10-14

## 2024-10-14 RX ORDER — SODIUM CHLORIDE 0.9 % (FLUSH) 0.9 %
1000 SYRINGE (ML) INJECTION ONCE
Refills: 0 | Status: COMPLETED | OUTPATIENT
Start: 2024-10-14 | End: 2024-10-14

## 2024-10-14 RX ADMIN — Medication 400 MILLIGRAM(S): at 18:19

## 2024-10-14 RX ADMIN — Medication 1000 MILLILITER(S): at 18:19

## 2024-10-14 RX ADMIN — Medication 10 MILLIGRAM(S): at 18:19
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
## 2024-10-14 NOTE — ED PROVIDER NOTE - PROGRESS NOTE DETAILS
Breana Harding DO PGY-1  Patient states headache is starting to improve, chest wall tednerness has not changed in severity but is only severe to touch. Breana Harding DO PGY-1  Patient states headache is starting to improve, chest wall tenderness has not changed in severity but is only severe to touch. Breana Harding DO PGY-1:  Patient reports her headache has improved significantly, chest wall tenderness has improved slightly.  Informed patient that she can take both Tylenol and Motrin at home for this pain.  Informed patient of negative chest x-ray results, no pneumonia, no pneumothorax no clear rib fractures.  Patient is stable for discharge with strict return precautions.

## 2024-10-14 NOTE — ED PROVIDER NOTE - PSYCHIATRIC, MLM
Alert and oriented to person, place, time/situation. tearful mood and affect. no apparent risk to self or others.

## 2024-10-14 NOTE — ED PROVIDER NOTE - NSFOLLOWUPINSTRUCTIONS_ED_ALL_ED_FT
You have been evaluated in the Emergency Department today for headache and chest pain. Your evaluation including Chest X-ray is negative for acute pathology.     We recommend you take 600mg ibuprofen every 6 hours or tylenol 650mg every 6 hours as needed for pain. If needed, you can alternate these medications so that you take one medication every 3 hours. For instance, at noon take ibuprofen, then at 3pm take tylenol, then at 6pm take ibuprofen.     Please follow up with your primary care physician within three days.    Chest pain can be caused by many different conditions which may or may not be dangerous. Causes include heartburn, lung infections, heart attack, blood clot in lungs, skin infections, strain or damage to muscle, cartilage, or bones, etc. In addition to a history and physical examination, an electrocardiogram (ECG) or other lab tests may have been performed to determine the cause of your chest pain. Follow up with your primary care provider or with a cardiologist as instructed.     SEEK IMMEDIATE MEDICAL CARE IF YOU HAVE ANY OF THE FOLLOWING SYMPTOMS: worsening chest pain, coughing up blood, unexplained back/neck/jaw pain, severe abdominal pain, dizziness or lightheadedness, fainting, shortness of breath, sweaty or clammy skin, vomiting, or racing heart beat. These symptoms may represent a serious problem that is an emergency. Do not wait to see if the symptoms will go away. Get medical help right away. Call 911 and do not drive yourself to the hospital.
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
## 2024-10-14 NOTE — ED ADULT NURSE NOTE - CAS ELECT INFOMATION PROVIDED
Pt called and LVM to juve a colon. Writer attempted to call pt number at [phone] x [phone], writer notes it kept going straight to  and stating pt VM was full unable to leave message, writer sent pt a colon screen letter and questionnaire. oral None known DC instructions

## 2024-10-14 NOTE — ED ADULT TRIAGE NOTE - ARRIVAL FROM
What Type Of Note Output Would You Prefer (Optional)?: Standard Output How Severe Is Your Skin Lesion?: mild Has Your Skin Lesion Been Treated?: not been treated Is This A New Presentation, Or A Follow-Up?: Skin Lesion Home
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
## 2024-10-14 NOTE — ED PROVIDER NOTE - PATIENT PORTAL LINK FT
You can access the FollowMyHealth Patient Portal offered by [facility] by registering at the following website: [email or web address]. By joining [facility]’s FollowMyHealth portal, you will also be able to view your health information using other applications (apps) compatible with our system.
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
## 2024-11-06 DIAGNOSIS — G89.18 OTHER ACUTE POSTPROCEDURAL PAIN: ICD-10-CM

## 2024-11-13 ENCOUNTER — NON-APPOINTMENT (OUTPATIENT)
Age: 29
End: 2024-11-13

## 2024-11-27 ENCOUNTER — APPOINTMENT (OUTPATIENT)
Dept: ORTHOPEDIC SURGERY | Facility: CLINIC | Age: 29
End: 2024-11-27

## 2024-11-27 DIAGNOSIS — Z80.8 FAMILY HISTORY OF MALIGNANT NEOPLASM OF OTHER ORGANS OR SYSTEMS: ICD-10-CM

## 2024-11-27 RX ORDER — GABAPENTIN 300 MG
300 TABLET ORAL
Refills: 0 | Status: ACTIVE | COMMUNITY

## 2024-12-04 ENCOUNTER — APPOINTMENT (OUTPATIENT)
Dept: ORTHOPEDIC SURGERY | Facility: CLINIC | Age: 29
End: 2024-12-04

## 2025-02-12 NOTE — PROCEDURE NOTE - [id number]_SKIN_A_CORE
I concur with the Admission Order and I certify that services are provided in accordance with Section 42 CFR § 412.3
nylon

## 2025-04-15 NOTE — ED ADULT TRIAGE NOTE - PRO INTERPRETER NEED 2
[place] [facility] ENDOSCOPY ENCOUNTER  PRE-OPERATIVE HISTORY AND PHYSICAL    ADMISSION DATE:  4/15/2025  CONSULTING PHYSICIAN:  Miguelito Borden MD  ATTENDING PHYSICIAN:  Miguelito Golden MD   PCP: Madelaine Mead MD  REQUESTING PROVIDER:  Miguelito NICOLE MD  CODE STATUS:  No Order    SUBJECTIVE:    Mecca Cisse is a 41 year old female patient admitted with Rectal bleed [K62.5]  Hemorrhoids, unspecified hemorrhoid type [K64.9].      Patient presents for COLONOSCOPY - MAC with [facility] anesthesia.    Reason for the procedure:   Rectal bleed [K62.5]  Hemorrhoids, unspecified hemorrhoid type [K64.9]    Patient has no new complaints.      MEDS  No current facility-administered medications for this encounter.       ALLERGIES:   Allergen Reactions    Amoxicillin RASH     Past Medical History:   Diagnosis Date    Hypothyroidism 03/14/2020     Past Surgical History:   Procedure Laterality Date    [place] tooth extraction       Social History     Tobacco Use    Smoking status: Every Day     Current packs/day: 0.25     Average packs/day: 0.3 packs/day for 14.0 years (3.5 ttl pk-yrs)     Types: Cigarettes    Smokeless tobacco: Never    Tobacco comments:     4 cigerettes a day   Vaping Use    Vaping status: never used   Substance Use Topics    Alcohol use: Yes     Comment: occasional    Drug use: Not Currently     Family History   Problem Relation Age of Onset    Thyroid Mother     Other Father         no relationship with father    Cirrhosis Father     Thyroid Maternal Grandmother     Heart disease Maternal Grandfather        REVIEW OF SYSTEMS:    All other systems are reviewed and are negative except as documented in the history of present illness.    PHYSICAL EXAM:    Vital Signs: Blood pressure 138/88, pulse 87, temperature 98.1 °F (36.7 °C), temperature source Temporal, resp. rate 18, height 5' 2\" (1.575 m), weight 74.8 kg (165 lb), last menstrual period 11/10/2024, SpO2 100%, not currently  breastfeeding.  General: The patient is well developed, well nourished, in no acute distress, appears stated age.   Respiratory: Respiratory effort normal.   Clear to auscultation bilaterally.  Cardiovascular: Regular rate and rhythm.     Gastrointestinal:  Soft and nontender.       ASSESSMENT / PLAN:     Rectal bleed [K62.5]  Hemorrhoids, unspecified hemorrhoid type [K64.9]    Risks, benefits and alternatives of the COLONOSCOPY - MAC with [facility] anesthesia was discussed with the patient.  The patient voiced understanding and agrees to proceed.    SIGNED:  Miguelito Borden MD  4/15/2025  8:44 AM                 English

## 2025-07-01 NOTE — PRE-OP CHECKLIST - SIDE RAILS UP
Medication passed protocol.     Medication: celecoxib 200 mg passed protocol.   Last office visit date: 05/14/2025  Next appointment scheduled?: Yes 10/15/2025   
done

## 2025-08-07 ENCOUNTER — NON-APPOINTMENT (OUTPATIENT)
Age: 30
End: 2025-08-07

## (undated) DEVICE — VISITEC 4X4

## (undated) DEVICE — SUT VICRYL 0 27" UR-6

## (undated) DEVICE — XI ARM FORCEP CADIERE 8MM

## (undated) DEVICE — ADAPTER FIBEROPTIC BRONCHOSCOPE DUAL AXIS SWIVEL

## (undated) DEVICE — BLADE SURGICAL #10 STAINLESS

## (undated) DEVICE — VALVE SUCTION EVIS 160/200/240

## (undated) DEVICE — ENDOCATCH GENERAL 15MM (PURPLE)

## (undated) DEVICE — DRAPE MAYO STAND 30"

## (undated) DEVICE — ENDOCATCH GENERAL 10MM (PURPLE)

## (undated) DEVICE — XI CORD BIPOLAR CAUTERY (BLUE)

## (undated) DEVICE — SUT POLYSORB 4-0 P-12 UNDYED

## (undated) DEVICE — GOWN TRIMAX LG

## (undated) DEVICE — SOL IRR POUR NS 0.9% 500ML

## (undated) DEVICE — MEDICATION LABELS W MARKER

## (undated) DEVICE — SOL INJ LR 1000ML

## (undated) DEVICE — PACK EXTREMITY

## (undated) DEVICE — XI ARM GRASPER BIPOLAR LONG 8MM

## (undated) DEVICE — TUBING SUCTION NONCONDUCTIVE 6MM X 12FT

## (undated) DEVICE — VALVE BIOPSY BRONCHOVIDEOSCOPE

## (undated) DEVICE — SUT SURGIPRO 0 30" GS-22

## (undated) DEVICE — DRSG TEGADERM 2.5X3"

## (undated) DEVICE — XI ARM FORCEP FENESTRATED BIPOLAR 8MM

## (undated) DEVICE — GLV 6.5 PROTEXIS (WHITE)

## (undated) DEVICE — GRASPER LAPA 5MMX35CM

## (undated) DEVICE — DRSG STOCKINETTE IMPERVIOUS MED

## (undated) DEVICE — XI DRAPE ARM

## (undated) DEVICE — PACK ROBOTIC LIJ

## (undated) DEVICE — TOURNIQUET ESMARK 4"

## (undated) DEVICE — TUBING OLYMPUS INSUFFLATION

## (undated) DEVICE — SUT POLYSORB 2-0 30" V-20 UNDYED

## (undated) DEVICE — BLADE SURGICAL #15 CARBON

## (undated) DEVICE — CHEST DRAIN PLEUR-EVAC DRY/WET ADULT-PEDS SINGLE (QUICK)

## (undated) DEVICE — TUBING SUCTION 20FT

## (undated) DEVICE — XI ARM PERMANENT CAUTERY SPATULA

## (undated) DEVICE — DRSG STOCKINETTE TUBULAR SYNTHETIC 1PLY 3X36"

## (undated) DEVICE — GLV 8 PROTEXIS (WHITE)

## (undated) DEVICE — WARMING BLANKET LOWER ADULT

## (undated) DEVICE — TROCAR COVIDIEN VERSASTEP PLUS 15MM STANDARD

## (undated) DEVICE — D HELP - CLEARVIEW CLEARIFY SYSTEM

## (undated) DEVICE — SPECIMEN CONTAINER 100ML

## (undated) DEVICE — SLING ARM CHIEFTAIN MESH MEDIUM

## (undated) DEVICE — GLV 7 PROTEXIS (WHITE)

## (undated) DEVICE — DRAPE TOWEL BLUE 17" X 24"

## (undated) DEVICE — POSITIONER STRAP ARMBOARD VELCRO TS-30

## (undated) DEVICE — NDL HYPO REGULAR BEVEL 22G X 1.5" (TURQUOISE)

## (undated) DEVICE — XI OBTURATOR OPTICAL BLADELESS 8MM

## (undated) DEVICE — ELCTR BOVIE PENCIL SMOKE EVACUATION

## (undated) DEVICE — SYR SLIP 10CC

## (undated) DEVICE — WARMING BLANKET UPPER ADULT

## (undated) DEVICE — TRAP SPECIMEN SPUTUM 40CC

## (undated) DEVICE — SUT MONOCRYL 4-0 27" PS-2 UNDYED

## (undated) DEVICE — LUBRICANT INST ELECTROLUBE Z SOLUTION

## (undated) DEVICE — GOWN XL

## (undated) DEVICE — DRAPE 3/4 SHEET 52X76"

## (undated) DEVICE — VENODYNE/SCD SLEEVE CALF MEDIUM

## (undated) DEVICE — GLV 7.5 PROTEXIS (WHITE)

## (undated) DEVICE — SUT SILK 0 24" SH DA

## (undated) DEVICE — ELCTR BIPOLAR CORD J&J 12FT DISP

## (undated) DEVICE — ELCTR GROUNDING PAD ADULT COVIDIEN

## (undated) DEVICE — TUBING STRYKEFLOW II SUCTION / IRRIGATOR

## (undated) DEVICE — SOL IRR POUR H2O 250ML

## (undated) DEVICE — DRSG GAUZE PACKTNER ROLL

## (undated) DEVICE — XI SEAL UNIVERSIAL 5-12MM

## (undated) DEVICE — TOURNIQUET CUFF 24" DUAL PORT SINGLE BLADDER LUER LOCK  (BLACK)

## (undated) DEVICE — XI ARM GRASPER TIP UP FENESTRATED

## (undated) DEVICE — POSITIONER FOAM HEAD CRADLE (PINK)

## (undated) DEVICE — DRAPE LARGE SHEET 72X85"

## (undated) DEVICE — ELCTR BOVIE TIP BLADE INSULATED 2.75" EDGE

## (undated) DEVICE — DRSG TELFA 3 X 8

## (undated) DEVICE — BLADE SCALPEL SAFETYLOCK #15

## (undated) DEVICE — XI DRAPE COLUMN

## (undated) DEVICE — DRSG STERISTRIPS 0.5 X 4"

## (undated) DEVICE — DRSG CURITY GAUZE SPONGE 4 X 4" 12-PLY

## (undated) DEVICE — DRAPE XRAY CASSETTE COVER DOUBLE 20X40"

## (undated) DEVICE — FOLEY TRAY 16FR 5CC LTX UMETER CLOSED

## (undated) DEVICE — VENODYNE/SCD SLEEVE CALF LARGE

## (undated) DEVICE — POSITIONER FOAM EGG CRATE ULNAR 2PCS (PINK)

## (undated) DEVICE — DRAPE INSTRUMENT POUCH 6.75" X 11"

## (undated) DEVICE — SUT PDS II 2-0 27" SH